# Patient Record
Sex: FEMALE | Race: WHITE | NOT HISPANIC OR LATINO
[De-identification: names, ages, dates, MRNs, and addresses within clinical notes are randomized per-mention and may not be internally consistent; named-entity substitution may affect disease eponyms.]

---

## 2017-03-22 PROBLEM — Z00.00 ENCOUNTER FOR PREVENTIVE HEALTH EXAMINATION: Status: ACTIVE | Noted: 2017-03-22

## 2017-03-31 PROBLEM — I48.92 ATRIAL FLUTTER: Status: ACTIVE | Noted: 2017-03-31

## 2017-03-31 PROBLEM — R42 DIZZINESS: Status: ACTIVE | Noted: 2017-03-31

## 2017-03-31 PROBLEM — R06.09 DOE (DYSPNEA ON EXERTION): Status: ACTIVE | Noted: 2017-03-31

## 2017-03-31 PROBLEM — Z78.9 SOCIAL ALCOHOL USE: Status: ACTIVE | Noted: 2017-03-31

## 2017-03-31 PROBLEM — Z87.891 FORMER SMOKER: Status: ACTIVE | Noted: 2017-03-31

## 2017-03-31 PROBLEM — Z86.79 HISTORY OF HYPERTENSION: Status: RESOLVED | Noted: 2017-03-31 | Resolved: 2017-03-31

## 2017-03-31 PROBLEM — I09.9 RHEUMATIC HEART DISEASE: Status: ACTIVE | Noted: 2017-03-31

## 2017-03-31 PROBLEM — Z86.39 HISTORY OF HYPERLIPIDEMIA: Status: RESOLVED | Noted: 2017-03-31 | Resolved: 2017-03-31

## 2017-03-31 PROBLEM — R00.2 PALPITATIONS: Status: ACTIVE | Noted: 2017-03-31

## 2017-03-31 PROBLEM — I34.0 MITRAL VALVE INSUFFICIENCY: Status: ACTIVE | Noted: 2017-03-31

## 2017-03-31 PROBLEM — Z86.79 HISTORY OF MITRAL VALVE STENOSIS: Status: RESOLVED | Noted: 2017-03-31 | Resolved: 2017-03-31

## 2017-04-05 ENCOUNTER — APPOINTMENT (OUTPATIENT)
Dept: CARDIOTHORACIC SURGERY | Facility: CLINIC | Age: 47
End: 2017-04-05

## 2017-04-05 DIAGNOSIS — Z87.891 PERSONAL HISTORY OF NICOTINE DEPENDENCE: ICD-10-CM

## 2017-04-05 DIAGNOSIS — Z86.39 PERSONAL HISTORY OF OTHER ENDOCRINE, NUTRITIONAL AND METABOLIC DISEASE: ICD-10-CM

## 2017-04-05 DIAGNOSIS — I34.0 NONRHEUMATIC MITRAL (VALVE) INSUFFICIENCY: ICD-10-CM

## 2017-04-05 DIAGNOSIS — I48.92 UNSPECIFIED ATRIAL FLUTTER: ICD-10-CM

## 2017-04-05 DIAGNOSIS — Z86.79 PERSONAL HISTORY OF OTHER DISEASES OF THE CIRCULATORY SYSTEM: ICD-10-CM

## 2017-04-05 DIAGNOSIS — Z78.9 OTHER SPECIFIED HEALTH STATUS: ICD-10-CM

## 2017-04-05 DIAGNOSIS — R06.09 OTHER FORMS OF DYSPNEA: ICD-10-CM

## 2017-04-05 DIAGNOSIS — R00.2 PALPITATIONS: ICD-10-CM

## 2017-04-05 DIAGNOSIS — I09.9 RHEUMATIC HEART DISEASE, UNSPECIFIED: ICD-10-CM

## 2017-04-05 DIAGNOSIS — R42 DIZZINESS AND GIDDINESS: ICD-10-CM

## 2017-04-10 ENCOUNTER — RECORD ABSTRACTING (OUTPATIENT)
Age: 47
End: 2017-04-10

## 2017-04-10 DIAGNOSIS — R01.1 CARDIAC MURMUR, UNSPECIFIED: ICD-10-CM

## 2017-04-12 ENCOUNTER — APPOINTMENT (OUTPATIENT)
Dept: CARDIOTHORACIC SURGERY | Facility: CLINIC | Age: 47
End: 2017-04-12

## 2017-04-26 ENCOUNTER — APPOINTMENT (OUTPATIENT)
Dept: CARDIOTHORACIC SURGERY | Facility: CLINIC | Age: 47
End: 2017-04-26

## 2017-04-26 VITALS — SYSTOLIC BLOOD PRESSURE: 102 MMHG | DIASTOLIC BLOOD PRESSURE: 45 MMHG

## 2017-04-26 VITALS
TEMPERATURE: 98.2 F | HEIGHT: 63 IN | OXYGEN SATURATION: 98 % | BODY MASS INDEX: 26.58 KG/M2 | SYSTOLIC BLOOD PRESSURE: 89 MMHG | RESPIRATION RATE: 15 BRPM | DIASTOLIC BLOOD PRESSURE: 46 MMHG | HEART RATE: 73 BPM | WEIGHT: 150 LBS

## 2017-04-26 DIAGNOSIS — Z78.9 OTHER SPECIFIED HEALTH STATUS: ICD-10-CM

## 2017-04-26 DIAGNOSIS — Z87.891 PERSONAL HISTORY OF NICOTINE DEPENDENCE: ICD-10-CM

## 2017-04-26 RX ORDER — ATORVASTATIN CALCIUM 20 MG/1
20 TABLET, FILM COATED ORAL
Refills: 0 | Status: DISCONTINUED | COMMUNITY
End: 2017-04-26

## 2017-06-13 ENCOUNTER — INPATIENT (INPATIENT)
Facility: HOSPITAL | Age: 47
LOS: 7 days | Discharge: HOME | DRG: 220 | End: 2017-06-21
Payer: COMMERCIAL

## 2017-06-13 VITALS
SYSTOLIC BLOOD PRESSURE: 96 MMHG | TEMPERATURE: 98 F | HEART RATE: 82 BPM | DIASTOLIC BLOOD PRESSURE: 60 MMHG | RESPIRATION RATE: 17 BRPM | WEIGHT: 149.91 LBS | HEIGHT: 63 IN | OXYGEN SATURATION: 98 %

## 2017-06-13 DIAGNOSIS — I48.91 UNSPECIFIED ATRIAL FIBRILLATION: ICD-10-CM

## 2017-06-13 DIAGNOSIS — I34.0 NONRHEUMATIC MITRAL (VALVE) INSUFFICIENCY: ICD-10-CM

## 2017-06-13 DIAGNOSIS — Z98.890 OTHER SPECIFIED POSTPROCEDURAL STATES: Chronic | ICD-10-CM

## 2017-06-13 LAB
ALBUMIN SERPL ELPH-MCNC: 4.1 G/DL — SIGNIFICANT CHANGE UP (ref 3.3–5)
ALP SERPL-CCNC: 65 U/L — SIGNIFICANT CHANGE UP (ref 40–120)
ALT FLD-CCNC: 21 U/L RC — SIGNIFICANT CHANGE UP (ref 10–45)
ANION GAP SERPL CALC-SCNC: 13 MMOL/L — SIGNIFICANT CHANGE UP (ref 5–17)
APPEARANCE UR: CLEAR — SIGNIFICANT CHANGE UP
APTT BLD: 32.4 SEC — SIGNIFICANT CHANGE UP (ref 27.5–37.4)
AST SERPL-CCNC: 32 U/L — SIGNIFICANT CHANGE UP (ref 10–40)
BILIRUB SERPL-MCNC: 0.6 MG/DL — SIGNIFICANT CHANGE UP (ref 0.2–1.2)
BILIRUB UR-MCNC: NEGATIVE — SIGNIFICANT CHANGE UP
BLD GP AB SCN SERPL QL: NEGATIVE — SIGNIFICANT CHANGE UP
BUN SERPL-MCNC: 32 MG/DL — HIGH (ref 7–23)
CALCIUM SERPL-MCNC: 9.5 MG/DL — SIGNIFICANT CHANGE UP (ref 8.4–10.5)
CHLORIDE SERPL-SCNC: 99 MMOL/L — SIGNIFICANT CHANGE UP (ref 96–108)
CO2 SERPL-SCNC: 28 MMOL/L — SIGNIFICANT CHANGE UP (ref 22–31)
COLOR SPEC: YELLOW — SIGNIFICANT CHANGE UP
CREAT SERPL-MCNC: 0.92 MG/DL — SIGNIFICANT CHANGE UP (ref 0.5–1.3)
DIFF PNL FLD: ABNORMAL
GLUCOSE SERPL-MCNC: 92 MG/DL — SIGNIFICANT CHANGE UP (ref 70–99)
GLUCOSE UR QL: NEGATIVE — SIGNIFICANT CHANGE UP
HCG SERPL-ACNC: <2 MIU/ML — SIGNIFICANT CHANGE UP
HCT VFR BLD CALC: 42.5 % — SIGNIFICANT CHANGE UP (ref 34.5–45)
HGB BLD-MCNC: 14 G/DL — SIGNIFICANT CHANGE UP (ref 11.5–15.5)
INR BLD: 1.48 RATIO — HIGH (ref 0.88–1.16)
KETONES UR-MCNC: NEGATIVE — SIGNIFICANT CHANGE UP
LEUKOCYTE ESTERASE UR-ACNC: NEGATIVE — SIGNIFICANT CHANGE UP
MCHC RBC-ENTMCNC: 31.3 PG — SIGNIFICANT CHANGE UP (ref 27–34)
MCHC RBC-ENTMCNC: 33 GM/DL — SIGNIFICANT CHANGE UP (ref 32–36)
MCV RBC AUTO: 95.1 FL — SIGNIFICANT CHANGE UP (ref 80–100)
NITRITE UR-MCNC: NEGATIVE — SIGNIFICANT CHANGE UP
NT-PROBNP SERPL-SCNC: 2160 PG/ML — HIGH (ref 0–300)
PA ADP PRP-ACNC: 237 PRU — SIGNIFICANT CHANGE UP (ref 194–417)
PH UR: 7 — SIGNIFICANT CHANGE UP (ref 5–8)
PLATELET # BLD AUTO: 241 K/UL — SIGNIFICANT CHANGE UP (ref 150–400)
POTASSIUM SERPL-MCNC: 3.6 MMOL/L — SIGNIFICANT CHANGE UP (ref 3.5–5.3)
POTASSIUM SERPL-SCNC: 3.6 MMOL/L — SIGNIFICANT CHANGE UP (ref 3.5–5.3)
PROT SERPL-MCNC: 7.3 G/DL — SIGNIFICANT CHANGE UP (ref 6–8.3)
PROT UR-MCNC: SIGNIFICANT CHANGE UP
PROTHROM AB SERPL-ACNC: 16.1 SEC — HIGH (ref 9.8–12.7)
RBC # BLD: 4.47 M/UL — SIGNIFICANT CHANGE UP (ref 3.8–5.2)
RBC # FLD: 11.3 % — SIGNIFICANT CHANGE UP (ref 10.3–14.5)
RH IG SCN BLD-IMP: POSITIVE — SIGNIFICANT CHANGE UP
SODIUM SERPL-SCNC: 140 MMOL/L — SIGNIFICANT CHANGE UP (ref 135–145)
SP GR SPEC: 1.02 — SIGNIFICANT CHANGE UP (ref 1.01–1.02)
UROBILINOGEN FLD QL: NEGATIVE — SIGNIFICANT CHANGE UP
WBC # BLD: 7 K/UL — SIGNIFICANT CHANGE UP (ref 3.8–10.5)
WBC # FLD AUTO: 7 K/UL — SIGNIFICANT CHANGE UP (ref 3.8–10.5)

## 2017-06-13 PROCEDURE — 99221 1ST HOSP IP/OBS SF/LOW 40: CPT

## 2017-06-13 RX ORDER — METOPROLOL TARTRATE 50 MG
50 TABLET ORAL EVERY 8 HOURS
Qty: 0 | Refills: 0 | Status: DISCONTINUED | OUTPATIENT
Start: 2017-06-13 | End: 2017-06-14

## 2017-06-13 RX ORDER — HEPARIN SODIUM 5000 [USP'U]/ML
INJECTION INTRAVENOUS; SUBCUTANEOUS
Qty: 25000 | Refills: 0 | Status: DISCONTINUED | OUTPATIENT
Start: 2017-06-13 | End: 2017-06-15

## 2017-06-13 RX ORDER — SODIUM CHLORIDE 9 MG/ML
3 INJECTION INTRAMUSCULAR; INTRAVENOUS; SUBCUTANEOUS EVERY 8 HOURS
Qty: 0 | Refills: 0 | Status: DISCONTINUED | OUTPATIENT
Start: 2017-06-13 | End: 2017-06-15

## 2017-06-13 RX ORDER — DIGOXIN 250 MCG
0.12 TABLET ORAL DAILY
Qty: 0 | Refills: 0 | Status: DISCONTINUED | OUTPATIENT
Start: 2017-06-13 | End: 2017-06-15

## 2017-06-13 RX ORDER — POTASSIUM CHLORIDE 20 MEQ
40 PACKET (EA) ORAL ONCE
Qty: 0 | Refills: 0 | Status: COMPLETED | OUTPATIENT
Start: 2017-06-13 | End: 2017-06-13

## 2017-06-13 RX ADMIN — HEPARIN SODIUM 1200 UNIT(S)/HR: 5000 INJECTION INTRAVENOUS; SUBCUTANEOUS at 19:20

## 2017-06-13 RX ADMIN — Medication 50 MILLIGRAM(S): at 22:04

## 2017-06-13 RX ADMIN — Medication 40 MILLIEQUIVALENT(S): at 20:39

## 2017-06-13 RX ADMIN — SODIUM CHLORIDE 3 MILLILITER(S): 9 INJECTION INTRAMUSCULAR; INTRAVENOUS; SUBCUTANEOUS at 22:04

## 2017-06-13 NOTE — H&P ADULT - PROBLEM SELECTOR PLAN 1
Min invasive MVR Thurs with Dr. Silva.  Continue home dose digoxin and metolazone.  Continue pre-op cardiac surgery workup.

## 2017-06-13 NOTE — H&P ADULT - NSHPPHYSICALEXAM_GEN_ALL_CORE
General: NAD  HEENT:  NC/AT  Respiratory: clear to auscultation bilaterally, no wheeze, no rhonchi, no crackles noted  Cardiovascular: + irregular rate and rhythm, S1 and S2 audible, + STEPHON murmur, gallop or rub noted  GI: bowel sounds present x4, abdomen soft, non tender, non distended  Peripheral Vascular:  1+ edema, 2+ peripheral pulses, no clubbing, cyanosis , +varicosities/PVD noted  Musculoskeletal: 5/5 strength bilateral upper and lower extremities  Psychiatric: Normal mood, normal affect observed  Neuro: alert and oriented x 4, gait steady, speech clear, no focal deficits noted  Skin: Right groin procedure site CDI.  no bleeding, no hematoma, site soft, non tender, positive pedal pulses bilaterally General: NAD  HEENT:  NC/AT  Neuro: A&Ox 4, gait steady, speech clear, no focal deficits noted  Respiratory: B/L BS CTA, no wheeze, no rhonchi, no crackles noted  Cardiovascular: + irregular rate and rhythm, S1 and S2 audible, + Systolic murmur 4/6  GI:  Abd soft, NT/ND, +BSx4Q  Peripheral Vascular:  B/L LE (-) edema, 2+ peripheral pulses, no clubbing, cyanosis   Musculoskeletal: 5/5 strength bilateral upper and lower extremities  Psychiatric: Normal mood, normal affect observed  Skin: Right groin procedure site CDI.  non-tender, no bleeding, no hematoma General: NAD  HEENT:  NC/AT  Neuro: A&Ox 4, gait steady, speech clear, no focal deficits noted  Respiratory: B/L BS CTA, no wheeze, no rhonchi, no crackles noted  Cardiovascular: + irregular rate and rhythm, S1 and S2 audible, + Systolic murmur 4/6  GI:  Abd soft, NT/ND, +BSx4Q  Peripheral Vascular:  B/L LE (-) edema, 2+ peripheral pulses, no clubbing, cyanosis   Musculoskeletal: 5/5 strength bilateral upper and lower extremities  Psychiatric: Normal mood, normal affect observed  Skin: Normal exam to inspection and palpation. Right groin cath incision CDI YAS, no hematoma.

## 2017-06-13 NOTE — H&P ADULT - NSHPSOCIALHISTORY_GEN_ALL_CORE
SOCIAL HISTORY:  Smoker: [ X] Yes  [ ] No        PACK YEARS:                         WHEN QUIT? stopped in 2016  ETOH use: [ ] Yes  [X] No              FREQUENCY / QUANTITY:  Ilicit Drug use:  [ ] Yes  [ X ] No SOCIAL HISTORY:  Smoker: [ X] Yes  [ ] No        PPD: 0.5 x 15 years                       WHEN QUIT? stopped in 2016  ETOH use: [ ] Yes  [X] No              FREQUENCY / QUANTITY:  Ilicit Drug use:  [ ] Yes  [ X ] No

## 2017-06-13 NOTE — H&P ADULT - HISTORY OF PRESENT ILLNESS
This is a 48 y/o female with PMHx of Afib on Coumadin, MR s/p cath at [facility] with normal coronaries, admitted to [facility] for pre-op MVR evaluation. Last dose of coumadin on Sat 6/10 per pt. Pt currently denies any CP, SOB, N/V/D, fever, chills or palpitations. This is a 48 y/o female with PMHx of Afib on Coumadin, MR s/p cath at [facility] in May 2017 with normal coronaries, admitted to [facility] for pre-op MVR evaluation. Last dose of coumadin on Sat 6/10 per pt. Pt currently denies any CP, SOB, N/V/D, fever, chills or palpitations.

## 2017-06-14 LAB
ANION GAP SERPL CALC-SCNC: 14 MMOL/L — SIGNIFICANT CHANGE UP (ref 5–17)
APTT BLD: 110.7 SEC — HIGH (ref 27.5–37.4)
APTT BLD: 144.9 SEC — CRITICAL HIGH (ref 27.5–37.4)
APTT BLD: 72.9 SEC — HIGH (ref 27.5–37.4)
BLD GP AB SCN SERPL QL: NEGATIVE — SIGNIFICANT CHANGE UP
BUN SERPL-MCNC: 35 MG/DL — HIGH (ref 7–23)
CALCIUM SERPL-MCNC: 9.3 MG/DL — SIGNIFICANT CHANGE UP (ref 8.4–10.5)
CHLORIDE SERPL-SCNC: 99 MMOL/L — SIGNIFICANT CHANGE UP (ref 96–108)
CO2 SERPL-SCNC: 25 MMOL/L — SIGNIFICANT CHANGE UP (ref 22–31)
CREAT SERPL-MCNC: 0.98 MG/DL — SIGNIFICANT CHANGE UP (ref 0.5–1.3)
GLUCOSE SERPL-MCNC: 99 MG/DL — SIGNIFICANT CHANGE UP (ref 70–99)
HBA1C BLD-MCNC: 5.5 % — SIGNIFICANT CHANGE UP (ref 4–5.6)
HCT VFR BLD CALC: 41.2 % — SIGNIFICANT CHANGE UP (ref 34.5–45)
HGB BLD-MCNC: 14 G/DL — SIGNIFICANT CHANGE UP (ref 11.5–15.5)
INR BLD: 1.52 RATIO — HIGH (ref 0.88–1.16)
MCHC RBC-ENTMCNC: 32.5 PG — SIGNIFICANT CHANGE UP (ref 27–34)
MCHC RBC-ENTMCNC: 34 GM/DL — SIGNIFICANT CHANGE UP (ref 32–36)
MCV RBC AUTO: 95.5 FL — SIGNIFICANT CHANGE UP (ref 80–100)
MRSA PCR RESULT.: SIGNIFICANT CHANGE UP
PLATELET # BLD AUTO: 225 K/UL — SIGNIFICANT CHANGE UP (ref 150–400)
POTASSIUM SERPL-MCNC: 4 MMOL/L — SIGNIFICANT CHANGE UP (ref 3.5–5.3)
POTASSIUM SERPL-SCNC: 4 MMOL/L — SIGNIFICANT CHANGE UP (ref 3.5–5.3)
PROTHROM AB SERPL-ACNC: 16.6 SEC — HIGH (ref 9.8–12.7)
RBC # BLD: 4.31 M/UL — SIGNIFICANT CHANGE UP (ref 3.8–5.2)
RBC # FLD: 11.3 % — SIGNIFICANT CHANGE UP (ref 10.3–14.5)
RH IG SCN BLD-IMP: POSITIVE — SIGNIFICANT CHANGE UP
S AUREUS DNA NOSE QL NAA+PROBE: SIGNIFICANT CHANGE UP
SODIUM SERPL-SCNC: 138 MMOL/L — SIGNIFICANT CHANGE UP (ref 135–145)
T3 SERPL-MCNC: 98 NG/DL — SIGNIFICANT CHANGE UP (ref 80–200)
T4 AB SER-ACNC: 6.5 UG/DL — SIGNIFICANT CHANGE UP (ref 4.6–12)
TSH SERPL-MCNC: 2.11 UIU/ML — SIGNIFICANT CHANGE UP (ref 0.27–4.2)
WBC # BLD: 8.2 K/UL — SIGNIFICANT CHANGE UP (ref 3.8–10.5)
WBC # FLD AUTO: 8.2 K/UL — SIGNIFICANT CHANGE UP (ref 3.8–10.5)

## 2017-06-14 PROCEDURE — 93010 ELECTROCARDIOGRAM REPORT: CPT

## 2017-06-14 PROCEDURE — 93880 EXTRACRANIAL BILAT STUDY: CPT | Mod: 26

## 2017-06-14 PROCEDURE — 71020: CPT | Mod: 26

## 2017-06-14 RX ORDER — CHLORHEXIDINE GLUCONATE 213 G/1000ML
1 SOLUTION TOPICAL
Qty: 0 | Refills: 0 | Status: DISCONTINUED | OUTPATIENT
Start: 2017-06-14 | End: 2017-06-15

## 2017-06-14 RX ORDER — METOPROLOL TARTRATE 50 MG
2.5 TABLET ORAL ONCE
Qty: 0 | Refills: 0 | Status: DISCONTINUED | OUTPATIENT
Start: 2017-06-14 | End: 2017-06-15

## 2017-06-14 RX ORDER — METOPROLOL TARTRATE 50 MG
25 TABLET ORAL ONCE
Qty: 0 | Refills: 0 | Status: COMPLETED | OUTPATIENT
Start: 2017-06-14 | End: 2017-06-14

## 2017-06-14 RX ORDER — METOPROLOL TARTRATE 50 MG
50 TABLET ORAL EVERY 8 HOURS
Qty: 0 | Refills: 0 | Status: DISCONTINUED | OUTPATIENT
Start: 2017-06-14 | End: 2017-06-14

## 2017-06-14 RX ORDER — CEFUROXIME AXETIL 250 MG
1500 TABLET ORAL ONCE
Qty: 0 | Refills: 0 | Status: DISCONTINUED | OUTPATIENT
Start: 2017-06-14 | End: 2017-06-15

## 2017-06-14 RX ORDER — METOPROLOL TARTRATE 50 MG
75 TABLET ORAL EVERY 8 HOURS
Qty: 0 | Refills: 0 | Status: DISCONTINUED | OUTPATIENT
Start: 2017-06-14 | End: 2017-06-15

## 2017-06-14 RX ADMIN — HEPARIN SODIUM 1000 UNIT(S)/HR: 5000 INJECTION INTRAVENOUS; SUBCUTANEOUS at 03:44

## 2017-06-14 RX ADMIN — Medication 50 MILLIGRAM(S): at 22:14

## 2017-06-14 RX ADMIN — HEPARIN SODIUM 0 UNIT(S)/HR: 5000 INJECTION INTRAVENOUS; SUBCUTANEOUS at 02:43

## 2017-06-14 RX ADMIN — SODIUM CHLORIDE 3 MILLILITER(S): 9 INJECTION INTRAMUSCULAR; INTRAVENOUS; SUBCUTANEOUS at 06:47

## 2017-06-14 RX ADMIN — SODIUM CHLORIDE 3 MILLILITER(S): 9 INJECTION INTRAMUSCULAR; INTRAVENOUS; SUBCUTANEOUS at 13:01

## 2017-06-14 RX ADMIN — SODIUM CHLORIDE 3 MILLILITER(S): 9 INJECTION INTRAMUSCULAR; INTRAVENOUS; SUBCUTANEOUS at 21:49

## 2017-06-14 RX ADMIN — CHLORHEXIDINE GLUCONATE 1 APPLICATION(S): 213 SOLUTION TOPICAL at 21:42

## 2017-06-14 RX ADMIN — HEPARIN SODIUM 900 UNIT(S)/HR: 5000 INJECTION INTRAVENOUS; SUBCUTANEOUS at 20:17

## 2017-06-14 RX ADMIN — HEPARIN SODIUM 900 UNIT(S)/HR: 5000 INJECTION INTRAVENOUS; SUBCUTANEOUS at 13:02

## 2017-06-14 RX ADMIN — Medication 25 MILLIGRAM(S): at 08:12

## 2017-06-14 NOTE — PROGRESS NOTE ADULT - SUBJECTIVE AND OBJECTIVE BOX
Cardiac Surgery Pre-op Note:    CC: Patient is a 47y old  Female who presents with a chief complaint of "I'm here for cardiac surgery" (2017 18:16)      Referring Physician:                                                                                                           Surgeon:    Procedure: (Date) (Procedure)    Allergies    No Known Allergies    Intolerances        HPI:  This is a 46 y/o female with PMHx of Afib on Coumadin, MR s/p cath at [facility] in May 2017 with normal coronaries, admitted to [facility] for pre-op MVR evaluation. Last dose of coumadin on Sat 6/10 per pt. Pt currently denies any CP, SOB, N/V/D, fever, chills or palpitations. (2017 18:16)      PAST MEDICAL & SURGICAL HISTORY:  Mitral regurgitation  Atrial fibrillation  Status post balloon mitral valvuloplasty:       MEDICATIONS  (STANDING):  sodium chloride 0.9% lock flush 3milliLiter(s) IV Push every 8 hours  digoxin     Tablet 0.125milliGRAM(s) Oral daily  metolazone 5milliGRAM(s) Oral <User Schedule>  heparin  Infusion. Unit(s)/Hr IV Continuous <Continuous>  metoprolol 50milliGRAM(s) Oral every 8 hours  cefuroxime  IVPB 1500milliGRAM(s) IV Intermittent once  chlorhexidine 4% Liquid 1Application(s) Topical two times a day    MEDICATIONS  (PRN):        Labs:                        14.0   8.2   )-----------( 225      ( 2017 01:29 )             41.2     06-    138  |  99  |  35<H>  ----------------------------<  99  4.0   |  25  |  0.98    Ca    9.3      2017 01:29    TPro  7.3  /  Alb  4.1  /  TBili  0.6  /  DBili  x   /  AST  32  /  ALT  21  /  AlkPhos  65  06-13    PT/INR - ( 2017 01:29 )   PT: 16.6 sec;   INR: 1.52 ratio         PTT - ( 2017 12:14 )  PTT:110.7 sec    Blood Type: ABO Interpretation: O ( @ 01:56)    HGB A1C: Hemoglobin A1C, Whole Blood: 5.5 % ( @ 23:24)    Prealbumin:   Pro-BNP: Serum Pro-Brain Natriuretic Peptide: 2160 pg/mL ( @ 18:41)    Thyroid Panel:   MRSA: MRSA PCR Result.: NotDetec ( @ 00:54)   / MSSA: MSSA PCR Result.: NotDetec ( @ 00:54)    Urinalysis Basic - ( 2017 18:45 )    Color: Yellow / Appearance: Clear / S.020 / pH: x  Gluc: x / Ketone: Negative  / Bili: Negative / Urobili: Negative   Blood: x / Protein: Trace / Nitrite: Negative   Leuk Esterase: Negative / RBC: 10-25 /HPF / WBC x   Sq Epi: x / Non Sq Epi: Occasional /HPF / Bacteria: x        CXR: clear    EKG:    Carotid Duplex:      PFT's:pending    Echocardiogram:    Cardiac catheterization:    Vein Mapping:    Gen: WN/WD NAD  Neuro: AAOx3, nonfocal  Pulm: CTA B/L  CV: RRR, S1S2  Abd: Soft, NT, ND +BS  Ext: No edema, + peripheral pulses      Pt has AICD/PPM [ ] Yes  [x ] No             Brand Name:  Pre-op Beta Blocker ordered within 24 hrs of surgery (CABG ONLY)?  [ ] Yes  [ ] No  If not, Why?  Type & Cross  [x ] Yes  [ ] No  NPO after Midnight [x ] Yes  [ ] No  Pre-op ABX ordered, to be taped on chart:  [x ] Yes  [ ] No     Hibiclens/Peridex ordered [x ] Yes  [ ] No  Intraop on Hold: PRBCs, CXR, REJI [ ]   Consent obtained  [x ] Yes  [ ] No

## 2017-06-15 ENCOUNTER — APPOINTMENT (OUTPATIENT)
Dept: CARDIOTHORACIC SURGERY | Facility: HOSPITAL | Age: 47
End: 2017-06-15

## 2017-06-15 ENCOUNTER — TRANSCRIPTION ENCOUNTER (OUTPATIENT)
Age: 47
End: 2017-06-15

## 2017-06-15 ENCOUNTER — RESULT REVIEW (OUTPATIENT)
Age: 47
End: 2017-06-15

## 2017-06-15 LAB
ALBUMIN SERPL ELPH-MCNC: 4 G/DL — SIGNIFICANT CHANGE UP (ref 3.3–5)
ALP SERPL-CCNC: 39 U/L — LOW (ref 40–120)
ALT FLD-CCNC: 17 U/L RC — SIGNIFICANT CHANGE UP (ref 10–45)
ANION GAP SERPL CALC-SCNC: 17 MMOL/L — SIGNIFICANT CHANGE UP (ref 5–17)
ANION GAP SERPL CALC-SCNC: 17 MMOL/L — SIGNIFICANT CHANGE UP (ref 5–17)
APTT BLD: 30.8 SEC — SIGNIFICANT CHANGE UP (ref 27.5–37.4)
APTT BLD: 71 SEC — HIGH (ref 27.5–37.4)
AST SERPL-CCNC: 58 U/L — HIGH (ref 10–40)
BASOPHILS # BLD AUTO: 0 K/UL — SIGNIFICANT CHANGE UP (ref 0–0.2)
BASOPHILS NFR BLD AUTO: 0.2 % — SIGNIFICANT CHANGE UP (ref 0–2)
BILIRUB SERPL-MCNC: 1.4 MG/DL — HIGH (ref 0.2–1.2)
BUN SERPL-MCNC: 21 MG/DL — SIGNIFICANT CHANGE UP (ref 7–23)
BUN SERPL-MCNC: 24 MG/DL — HIGH (ref 7–23)
CALCIUM SERPL-MCNC: 11.2 MG/DL — HIGH (ref 8.4–10.5)
CALCIUM SERPL-MCNC: 9 MG/DL — SIGNIFICANT CHANGE UP (ref 8.4–10.5)
CHLORIDE SERPL-SCNC: 100 MMOL/L — SIGNIFICANT CHANGE UP (ref 96–108)
CHLORIDE SERPL-SCNC: 96 MMOL/L — SIGNIFICANT CHANGE UP (ref 96–108)
CK MB BLD-MCNC: 2 % — SIGNIFICANT CHANGE UP (ref 0–3.5)
CK MB CFR SERPL CALC: 23.4 NG/ML — HIGH (ref 0–3.8)
CK SERPL-CCNC: 1193 U/L — HIGH (ref 25–170)
CO2 SERPL-SCNC: 23 MMOL/L — SIGNIFICANT CHANGE UP (ref 22–31)
CO2 SERPL-SCNC: 24 MMOL/L — SIGNIFICANT CHANGE UP (ref 22–31)
CREAT SERPL-MCNC: 0.78 MG/DL — SIGNIFICANT CHANGE UP (ref 0.5–1.3)
CREAT SERPL-MCNC: 0.97 MG/DL — SIGNIFICANT CHANGE UP (ref 0.5–1.3)
EOSINOPHIL # BLD AUTO: 0.1 K/UL — SIGNIFICANT CHANGE UP (ref 0–0.5)
EOSINOPHIL NFR BLD AUTO: 0.8 % — SIGNIFICANT CHANGE UP (ref 0–6)
FIBRINOGEN PPP-MCNC: 266 MG/DL — LOW (ref 310–510)
GAS PNL BLDA: SIGNIFICANT CHANGE UP
GLUCOSE SERPL-MCNC: 119 MG/DL — HIGH (ref 70–99)
GLUCOSE SERPL-MCNC: 96 MG/DL — SIGNIFICANT CHANGE UP (ref 70–99)
HCT VFR BLD CALC: 36.9 % — SIGNIFICANT CHANGE UP (ref 34.5–45)
HCT VFR BLD CALC: 42.7 % — SIGNIFICANT CHANGE UP (ref 34.5–45)
HGB BLD-MCNC: 13.2 G/DL — SIGNIFICANT CHANGE UP (ref 11.5–15.5)
HGB BLD-MCNC: 15 G/DL — SIGNIFICANT CHANGE UP (ref 11.5–15.5)
INR BLD: 1.42 RATIO — HIGH (ref 0.88–1.16)
LYMPHOCYTES # BLD AUTO: 1.6 K/UL — SIGNIFICANT CHANGE UP (ref 1–3.3)
LYMPHOCYTES # BLD AUTO: 10.6 % — LOW (ref 13–44)
MAGNESIUM SERPL-MCNC: 1.5 MG/DL — LOW (ref 1.6–2.6)
MCHC RBC-ENTMCNC: 33.4 PG — SIGNIFICANT CHANGE UP (ref 27–34)
MCHC RBC-ENTMCNC: 33.9 PG — SIGNIFICANT CHANGE UP (ref 27–34)
MCHC RBC-ENTMCNC: 35.1 GM/DL — SIGNIFICANT CHANGE UP (ref 32–36)
MCHC RBC-ENTMCNC: 35.7 GM/DL — SIGNIFICANT CHANGE UP (ref 32–36)
MCV RBC AUTO: 95 FL — SIGNIFICANT CHANGE UP (ref 80–100)
MCV RBC AUTO: 95.3 FL — SIGNIFICANT CHANGE UP (ref 80–100)
MONOCYTES # BLD AUTO: 0.6 K/UL — SIGNIFICANT CHANGE UP (ref 0–0.9)
MONOCYTES NFR BLD AUTO: 4.1 % — SIGNIFICANT CHANGE UP (ref 2–14)
NEUTROPHILS # BLD AUTO: 12.4 K/UL — HIGH (ref 1.8–7.4)
NEUTROPHILS NFR BLD AUTO: 84.3 % — HIGH (ref 43–77)
PLATELET # BLD AUTO: 150 K/UL — SIGNIFICANT CHANGE UP (ref 150–400)
PLATELET # BLD AUTO: 234 K/UL — SIGNIFICANT CHANGE UP (ref 150–400)
POTASSIUM SERPL-MCNC: 3.3 MMOL/L — LOW (ref 3.5–5.3)
POTASSIUM SERPL-MCNC: 4.1 MMOL/L — SIGNIFICANT CHANGE UP (ref 3.5–5.3)
POTASSIUM SERPL-SCNC: 3.3 MMOL/L — LOW (ref 3.5–5.3)
POTASSIUM SERPL-SCNC: 4.1 MMOL/L — SIGNIFICANT CHANGE UP (ref 3.5–5.3)
PROT SERPL-MCNC: 5.9 G/DL — LOW (ref 6–8.3)
PROTHROM AB SERPL-ACNC: 15.5 SEC — HIGH (ref 9.8–12.7)
RBC # BLD: 3.89 M/UL — SIGNIFICANT CHANGE UP (ref 3.8–5.2)
RBC # BLD: 4.48 M/UL — SIGNIFICANT CHANGE UP (ref 3.8–5.2)
RBC # FLD: 11.4 % — SIGNIFICANT CHANGE UP (ref 10.3–14.5)
RBC # FLD: 11.4 % — SIGNIFICANT CHANGE UP (ref 10.3–14.5)
SODIUM SERPL-SCNC: 137 MMOL/L — SIGNIFICANT CHANGE UP (ref 135–145)
SODIUM SERPL-SCNC: 140 MMOL/L — SIGNIFICANT CHANGE UP (ref 135–145)
TROPONIN T SERPL-MCNC: 0.41 NG/ML — HIGH (ref 0–0.06)
WBC # BLD: 14.8 K/UL — HIGH (ref 3.8–10.5)
WBC # BLD: 7.6 K/UL — SIGNIFICANT CHANGE UP (ref 3.8–10.5)
WBC # FLD AUTO: 14.8 K/UL — HIGH (ref 3.8–10.5)
WBC # FLD AUTO: 7.6 K/UL — SIGNIFICANT CHANGE UP (ref 3.8–10.5)

## 2017-06-15 PROCEDURE — 71010: CPT | Mod: 26

## 2017-06-15 PROCEDURE — 33430 REPLACEMENT OF MITRAL VALVE: CPT

## 2017-06-15 PROCEDURE — 93010 ELECTROCARDIOGRAM REPORT: CPT

## 2017-06-15 PROCEDURE — 88305 TISSUE EXAM BY PATHOLOGIST: CPT | Mod: 26

## 2017-06-15 RX ORDER — POTASSIUM CHLORIDE 20 MEQ
10 PACKET (EA) ORAL
Qty: 0 | Refills: 0 | Status: DISCONTINUED | OUTPATIENT
Start: 2017-06-15 | End: 2017-06-16

## 2017-06-15 RX ORDER — NOREPINEPHRINE BITARTRATE/D5W 8 MG/250ML
0.04 PLASTIC BAG, INJECTION (ML) INTRAVENOUS
Qty: 8 | Refills: 0 | Status: DISCONTINUED | OUTPATIENT
Start: 2017-06-15 | End: 2017-06-16

## 2017-06-15 RX ORDER — ASPIRIN/CALCIUM CARB/MAGNESIUM 324 MG
325 TABLET ORAL DAILY
Qty: 0 | Refills: 0 | Status: DISCONTINUED | OUTPATIENT
Start: 2017-06-15 | End: 2017-06-15

## 2017-06-15 RX ORDER — INSULIN HUMAN 100 [IU]/ML
2 INJECTION, SOLUTION SUBCUTANEOUS
Qty: 100 | Refills: 0 | Status: DISCONTINUED | OUTPATIENT
Start: 2017-06-15 | End: 2017-06-16

## 2017-06-15 RX ORDER — DEXMEDETOMIDINE HYDROCHLORIDE IN 0.9% SODIUM CHLORIDE 4 UG/ML
0.2 INJECTION INTRAVENOUS
Qty: 200 | Refills: 0 | Status: DISCONTINUED | OUTPATIENT
Start: 2017-06-15 | End: 2017-06-15

## 2017-06-15 RX ORDER — FENTANYL CITRATE 50 UG/ML
25 INJECTION INTRAVENOUS ONCE
Qty: 0 | Refills: 0 | Status: DISCONTINUED | OUTPATIENT
Start: 2017-06-15 | End: 2017-06-15

## 2017-06-15 RX ORDER — WARFARIN SODIUM 2.5 MG/1
5 TABLET ORAL ONCE
Qty: 0 | Refills: 0 | Status: COMPLETED | OUTPATIENT
Start: 2017-06-15 | End: 2017-06-15

## 2017-06-15 RX ORDER — SODIUM CHLORIDE 9 MG/ML
1000 INJECTION, SOLUTION INTRAVENOUS
Qty: 0 | Refills: 0 | Status: DISCONTINUED | OUTPATIENT
Start: 2017-06-15 | End: 2017-06-16

## 2017-06-15 RX ORDER — METOCLOPRAMIDE HCL 10 MG
10 TABLET ORAL EVERY 8 HOURS
Qty: 0 | Refills: 0 | Status: COMPLETED | OUTPATIENT
Start: 2017-06-15 | End: 2017-06-16

## 2017-06-15 RX ORDER — ALBUMIN HUMAN 25 %
250 VIAL (ML) INTRAVENOUS ONCE
Qty: 0 | Refills: 0 | Status: COMPLETED | OUTPATIENT
Start: 2017-06-15 | End: 2017-06-15

## 2017-06-15 RX ORDER — ASPIRIN/CALCIUM CARB/MAGNESIUM 324 MG
81 TABLET ORAL DAILY
Qty: 0 | Refills: 0 | Status: DISCONTINUED | OUTPATIENT
Start: 2017-06-16 | End: 2017-06-21

## 2017-06-15 RX ORDER — HYDROMORPHONE HYDROCHLORIDE 2 MG/ML
0.5 INJECTION INTRAMUSCULAR; INTRAVENOUS; SUBCUTANEOUS ONCE
Qty: 0 | Refills: 0 | Status: DISCONTINUED | OUTPATIENT
Start: 2017-06-15 | End: 2017-06-15

## 2017-06-15 RX ORDER — CEFUROXIME AXETIL 250 MG
1500 TABLET ORAL EVERY 8 HOURS
Qty: 0 | Refills: 0 | Status: COMPLETED | OUTPATIENT
Start: 2017-06-15 | End: 2017-06-17

## 2017-06-15 RX ORDER — POTASSIUM CHLORIDE 20 MEQ
10 PACKET (EA) ORAL ONCE
Qty: 0 | Refills: 0 | Status: DISCONTINUED | OUTPATIENT
Start: 2017-06-15 | End: 2017-06-16

## 2017-06-15 RX ORDER — POTASSIUM CHLORIDE 20 MEQ
10 PACKET (EA) ORAL
Qty: 0 | Refills: 0 | Status: COMPLETED | OUTPATIENT
Start: 2017-06-15 | End: 2017-06-15

## 2017-06-15 RX ORDER — POTASSIUM CHLORIDE 20 MEQ
10 PACKET (EA) ORAL
Qty: 0 | Refills: 0 | Status: COMPLETED | OUTPATIENT
Start: 2017-06-15

## 2017-06-15 RX ORDER — PANTOPRAZOLE SODIUM 20 MG/1
40 TABLET, DELAYED RELEASE ORAL DAILY
Qty: 0 | Refills: 0 | Status: DISCONTINUED | OUTPATIENT
Start: 2017-06-15 | End: 2017-06-16

## 2017-06-15 RX ORDER — MEPERIDINE HYDROCHLORIDE 50 MG/ML
25 INJECTION INTRAMUSCULAR; INTRAVENOUS; SUBCUTANEOUS ONCE
Qty: 0 | Refills: 0 | Status: DISCONTINUED | OUTPATIENT
Start: 2017-06-15 | End: 2017-06-15

## 2017-06-15 RX ADMIN — Medication 125 MILLILITER(S): at 16:54

## 2017-06-15 RX ADMIN — Medication 100 MILLIEQUIVALENT(S): at 12:50

## 2017-06-15 RX ADMIN — FENTANYL CITRATE 25 MICROGRAM(S): 50 INJECTION INTRAVENOUS at 16:25

## 2017-06-15 RX ADMIN — Medication 125 MILLILITER(S): at 13:15

## 2017-06-15 RX ADMIN — INSULIN HUMAN 2 UNIT(S)/HR: 100 INJECTION, SOLUTION SUBCUTANEOUS at 16:00

## 2017-06-15 RX ADMIN — FENTANYL CITRATE 25 MICROGRAM(S): 50 INJECTION INTRAVENOUS at 16:10

## 2017-06-15 RX ADMIN — SODIUM CHLORIDE 3 MILLILITER(S): 9 INJECTION INTRAMUSCULAR; INTRAVENOUS; SUBCUTANEOUS at 05:04

## 2017-06-15 RX ADMIN — WARFARIN SODIUM 5 MILLIGRAM(S): 2.5 TABLET ORAL at 22:04

## 2017-06-15 RX ADMIN — Medication 500 MILLILITER(S): at 21:57

## 2017-06-15 RX ADMIN — Medication 125 MILLILITER(S): at 20:20

## 2017-06-15 RX ADMIN — Medication 10 MILLIGRAM(S): at 15:37

## 2017-06-15 RX ADMIN — HYDROMORPHONE HYDROCHLORIDE 0.5 MILLIGRAM(S): 2 INJECTION INTRAMUSCULAR; INTRAVENOUS; SUBCUTANEOUS at 20:30

## 2017-06-15 RX ADMIN — HYDROMORPHONE HYDROCHLORIDE 0.5 MILLIGRAM(S): 2 INJECTION INTRAMUSCULAR; INTRAVENOUS; SUBCUTANEOUS at 20:45

## 2017-06-15 RX ADMIN — PANTOPRAZOLE SODIUM 40 MILLIGRAM(S): 20 TABLET, DELAYED RELEASE ORAL at 16:54

## 2017-06-15 RX ADMIN — Medication 10 MILLIGRAM(S): at 22:04

## 2017-06-15 RX ADMIN — HEPARIN SODIUM 900 UNIT(S)/HR: 5000 INJECTION INTRAVENOUS; SUBCUTANEOUS at 02:49

## 2017-06-15 RX ADMIN — Medication 125 MILLILITER(S): at 13:40

## 2017-06-15 RX ADMIN — DEXMEDETOMIDINE HYDROCHLORIDE IN 0.9% SODIUM CHLORIDE 3.4 MICROGRAM(S)/KG/HR: 4 INJECTION INTRAVENOUS at 15:07

## 2017-06-15 RX ADMIN — Medication 100 MILLIEQUIVALENT(S): at 18:02

## 2017-06-15 RX ADMIN — Medication 100 MILLIGRAM(S): at 16:12

## 2017-06-15 RX ADMIN — Medication 100 MILLIEQUIVALENT(S): at 14:15

## 2017-06-15 RX ADMIN — Medication 100 MILLIEQUIVALENT(S): at 21:15

## 2017-06-15 RX ADMIN — Medication 100 MILLIEQUIVALENT(S): at 17:43

## 2017-06-15 RX ADMIN — Medication 0.12 MILLIGRAM(S): at 05:10

## 2017-06-15 RX ADMIN — Medication 100 MILLIEQUIVALENT(S): at 13:30

## 2017-06-15 RX ADMIN — Medication 100 MILLIEQUIVALENT(S): at 22:40

## 2017-06-15 RX ADMIN — Medication 100 MILLIEQUIVALENT(S): at 22:05

## 2017-06-15 RX ADMIN — Medication 75 MILLIGRAM(S): at 05:10

## 2017-06-15 RX ADMIN — Medication 100 MILLIEQUIVALENT(S): at 17:10

## 2017-06-15 RX ADMIN — Medication 100 MILLIEQUIVALENT(S): at 20:30

## 2017-06-15 NOTE — BRIEF OPERATIVE NOTE - POST-OP DX
MR (mitral regurgitation)  06/15/2017    Active  Saul Morocho  MS (mitral stenosis)  06/15/2017    Active  Saul Morocho

## 2017-06-15 NOTE — PROVIDER CONTACT NOTE (OTHER) - ASSESSMENT
Patient aox4, stable, denies any discomfort, vs wdl, patient just came out of bathroom after shower.

## 2017-06-15 NOTE — AIRWAY REMOVAL NOTE  ADULT & PEDS - ARTIFICAL AIRWAY REMOVAL COMMENTS
Written order for extubation verified. The patient was identified by full name and birth date compared to the identification band. Present during the procedure was Aileen RRT

## 2017-06-16 DIAGNOSIS — J95.89 OTHER POSTPROCEDURAL COMPLICATIONS AND DISORDERS OF RESPIRATORY SYSTEM, NOT ELSEWHERE CLASSIFIED: ICD-10-CM

## 2017-06-16 DIAGNOSIS — Z29.9 ENCOUNTER FOR PROPHYLACTIC MEASURES, UNSPECIFIED: ICD-10-CM

## 2017-06-16 DIAGNOSIS — I34.0 NONRHEUMATIC MITRAL (VALVE) INSUFFICIENCY: ICD-10-CM

## 2017-06-16 LAB
ALBUMIN SERPL ELPH-MCNC: 4.5 G/DL — SIGNIFICANT CHANGE UP (ref 3.3–5)
ALP SERPL-CCNC: 26 U/L — LOW (ref 40–120)
ALT FLD-CCNC: 16 U/L RC — SIGNIFICANT CHANGE UP (ref 10–45)
ANION GAP SERPL CALC-SCNC: 16 MMOL/L — SIGNIFICANT CHANGE UP (ref 5–17)
APTT BLD: 27.6 SEC — SIGNIFICANT CHANGE UP (ref 27.5–37.4)
AST SERPL-CCNC: 57 U/L — HIGH (ref 10–40)
BILIRUB SERPL-MCNC: 2.2 MG/DL — HIGH (ref 0.2–1.2)
BUN SERPL-MCNC: 23 MG/DL — SIGNIFICANT CHANGE UP (ref 7–23)
CALCIUM SERPL-MCNC: 9.4 MG/DL — SIGNIFICANT CHANGE UP (ref 8.4–10.5)
CHLORIDE SERPL-SCNC: 101 MMOL/L — SIGNIFICANT CHANGE UP (ref 96–108)
CO2 SERPL-SCNC: 25 MMOL/L — SIGNIFICANT CHANGE UP (ref 22–31)
CREAT SERPL-MCNC: 1.04 MG/DL — SIGNIFICANT CHANGE UP (ref 0.5–1.3)
GAS PNL BLDA: SIGNIFICANT CHANGE UP
GLUCOSE SERPL-MCNC: 140 MG/DL — HIGH (ref 70–99)
HCT VFR BLD CALC: 29.5 % — LOW (ref 34.5–45)
HGB BLD-MCNC: 10.4 G/DL — LOW (ref 11.5–15.5)
INR BLD: 1.33 RATIO — HIGH (ref 0.88–1.16)
MCHC RBC-ENTMCNC: 34 PG — SIGNIFICANT CHANGE UP (ref 27–34)
MCHC RBC-ENTMCNC: 35.2 GM/DL — SIGNIFICANT CHANGE UP (ref 32–36)
MCV RBC AUTO: 96.6 FL — SIGNIFICANT CHANGE UP (ref 80–100)
PHOSPHATE SERPL-MCNC: 4.2 MG/DL — SIGNIFICANT CHANGE UP (ref 2.5–4.5)
PLATELET # BLD AUTO: 115 K/UL — LOW (ref 150–400)
POTASSIUM SERPL-MCNC: 4.1 MMOL/L — SIGNIFICANT CHANGE UP (ref 3.5–5.3)
POTASSIUM SERPL-SCNC: 4.1 MMOL/L — SIGNIFICANT CHANGE UP (ref 3.5–5.3)
PROT SERPL-MCNC: 6.3 G/DL — SIGNIFICANT CHANGE UP (ref 6–8.3)
PROTHROM AB SERPL-ACNC: 14.5 SEC — HIGH (ref 9.8–12.7)
RBC # BLD: 3.06 M/UL — LOW (ref 3.8–5.2)
RBC # FLD: 11.4 % — SIGNIFICANT CHANGE UP (ref 10.3–14.5)
SODIUM SERPL-SCNC: 142 MMOL/L — SIGNIFICANT CHANGE UP (ref 135–145)
WBC # BLD: 11.7 K/UL — HIGH (ref 3.8–10.5)
WBC # FLD AUTO: 11.7 K/UL — HIGH (ref 3.8–10.5)

## 2017-06-16 PROCEDURE — 93010 ELECTROCARDIOGRAM REPORT: CPT

## 2017-06-16 PROCEDURE — 71010: CPT | Mod: 26

## 2017-06-16 RX ORDER — METOPROLOL TARTRATE 50 MG
12.5 TABLET ORAL EVERY 12 HOURS
Qty: 0 | Refills: 0 | Status: DISCONTINUED | OUTPATIENT
Start: 2017-06-16 | End: 2017-06-17

## 2017-06-16 RX ORDER — METOCLOPRAMIDE HCL 10 MG
10 TABLET ORAL EVERY 8 HOURS
Qty: 0 | Refills: 0 | Status: DISCONTINUED | OUTPATIENT
Start: 2017-06-16 | End: 2017-06-16

## 2017-06-16 RX ORDER — HYDROMORPHONE HYDROCHLORIDE 2 MG/ML
4 INJECTION INTRAMUSCULAR; INTRAVENOUS; SUBCUTANEOUS EVERY 4 HOURS
Qty: 0 | Refills: 0 | Status: DISCONTINUED | OUTPATIENT
Start: 2017-06-16 | End: 2017-06-21

## 2017-06-16 RX ORDER — POTASSIUM CHLORIDE 20 MEQ
10 PACKET (EA) ORAL
Qty: 0 | Refills: 0 | Status: COMPLETED | OUTPATIENT
Start: 2017-06-16 | End: 2017-06-16

## 2017-06-16 RX ORDER — HYDROMORPHONE HYDROCHLORIDE 2 MG/ML
2 INJECTION INTRAMUSCULAR; INTRAVENOUS; SUBCUTANEOUS EVERY 4 HOURS
Qty: 0 | Refills: 0 | Status: DISCONTINUED | OUTPATIENT
Start: 2017-06-16 | End: 2017-06-21

## 2017-06-16 RX ORDER — INSULIN LISPRO 100/ML
VIAL (ML) SUBCUTANEOUS
Qty: 0 | Refills: 0 | Status: DISCONTINUED | OUTPATIENT
Start: 2017-06-16 | End: 2017-06-18

## 2017-06-16 RX ORDER — SODIUM CHLORIDE 9 MG/ML
3 INJECTION INTRAMUSCULAR; INTRAVENOUS; SUBCUTANEOUS EVERY 8 HOURS
Qty: 0 | Refills: 0 | Status: DISCONTINUED | OUTPATIENT
Start: 2017-06-16 | End: 2017-06-21

## 2017-06-16 RX ORDER — HEPARIN SODIUM 5000 [USP'U]/ML
5000 INJECTION INTRAVENOUS; SUBCUTANEOUS EVERY 8 HOURS
Qty: 0 | Refills: 0 | Status: DISCONTINUED | OUTPATIENT
Start: 2017-06-16 | End: 2017-06-21

## 2017-06-16 RX ORDER — POLYETHYLENE GLYCOL 3350 17 G/17G
17 POWDER, FOR SOLUTION ORAL DAILY
Qty: 0 | Refills: 0 | Status: DISCONTINUED | OUTPATIENT
Start: 2017-06-16 | End: 2017-06-21

## 2017-06-16 RX ORDER — WARFARIN SODIUM 2.5 MG/1
5 TABLET ORAL ONCE
Qty: 0 | Refills: 0 | Status: COMPLETED | OUTPATIENT
Start: 2017-06-16 | End: 2017-06-16

## 2017-06-16 RX ORDER — PANTOPRAZOLE SODIUM 20 MG/1
40 TABLET, DELAYED RELEASE ORAL
Qty: 0 | Refills: 0 | Status: DISCONTINUED | OUTPATIENT
Start: 2017-06-16 | End: 2017-06-21

## 2017-06-16 RX ORDER — DOCUSATE SODIUM 100 MG
100 CAPSULE ORAL THREE TIMES A DAY
Qty: 0 | Refills: 0 | Status: DISCONTINUED | OUTPATIENT
Start: 2017-06-16 | End: 2017-06-21

## 2017-06-16 RX ADMIN — Medication 100 MILLIGRAM(S): at 07:59

## 2017-06-16 RX ADMIN — Medication 100 MILLIGRAM(S): at 16:17

## 2017-06-16 RX ADMIN — HEPARIN SODIUM 5000 UNIT(S): 5000 INJECTION INTRAVENOUS; SUBCUTANEOUS at 13:38

## 2017-06-16 RX ADMIN — PANTOPRAZOLE SODIUM 40 MILLIGRAM(S): 20 TABLET, DELAYED RELEASE ORAL at 11:10

## 2017-06-16 RX ADMIN — WARFARIN SODIUM 5 MILLIGRAM(S): 2.5 TABLET ORAL at 21:31

## 2017-06-16 RX ADMIN — Medication 12.5 MILLIGRAM(S): at 17:11

## 2017-06-16 RX ADMIN — Medication 100 MILLIGRAM(S): at 00:15

## 2017-06-16 RX ADMIN — Medication 10 MILLIGRAM(S): at 21:35

## 2017-06-16 RX ADMIN — Medication 100 MILLIEQUIVALENT(S): at 07:01

## 2017-06-16 RX ADMIN — Medication 2: at 13:38

## 2017-06-16 RX ADMIN — Medication 2: at 18:37

## 2017-06-16 RX ADMIN — HEPARIN SODIUM 5000 UNIT(S): 5000 INJECTION INTRAVENOUS; SUBCUTANEOUS at 21:35

## 2017-06-16 RX ADMIN — HYDROMORPHONE HYDROCHLORIDE 4 MILLIGRAM(S): 2 INJECTION INTRAMUSCULAR; INTRAVENOUS; SUBCUTANEOUS at 16:17

## 2017-06-16 RX ADMIN — Medication 100 MILLIEQUIVALENT(S): at 06:15

## 2017-06-16 RX ADMIN — SODIUM CHLORIDE 3 MILLILITER(S): 9 INJECTION INTRAMUSCULAR; INTRAVENOUS; SUBCUTANEOUS at 13:39

## 2017-06-16 RX ADMIN — Medication 10 MILLIGRAM(S): at 05:13

## 2017-06-16 RX ADMIN — Medication 100 MILLIGRAM(S): at 21:31

## 2017-06-16 RX ADMIN — POLYETHYLENE GLYCOL 3350 17 GRAM(S): 17 POWDER, FOR SOLUTION ORAL at 16:18

## 2017-06-16 RX ADMIN — Medication 81 MILLIGRAM(S): at 11:10

## 2017-06-16 RX ADMIN — Medication 100 MILLIGRAM(S): at 13:38

## 2017-06-16 RX ADMIN — Medication 10 MILLIGRAM(S): at 15:19

## 2017-06-16 RX ADMIN — Medication 100 MILLIEQUIVALENT(S): at 08:01

## 2017-06-16 RX ADMIN — SODIUM CHLORIDE 3 MILLILITER(S): 9 INJECTION INTRAMUSCULAR; INTRAVENOUS; SUBCUTANEOUS at 21:38

## 2017-06-16 RX ADMIN — HYDROMORPHONE HYDROCHLORIDE 4 MILLIGRAM(S): 2 INJECTION INTRAMUSCULAR; INTRAVENOUS; SUBCUTANEOUS at 16:47

## 2017-06-16 NOTE — PHYSICAL THERAPY INITIAL EVALUATION ADULT - ADDITIONAL COMMENTS
Pt resides in apt with spouse & 2 dtr's, about 3 steps to enter. PTA independent in mobility and ADL's, works as H[employer].

## 2017-06-16 NOTE — PROGRESS NOTE ADULT - SUBJECTIVE AND OBJECTIVE BOX
JOSH LUX  MRN#: [id number]  Subjective:  Patient was seen and evalauted on AM rounds offerring no specific compliants at this time.    OBJECTIVE:  ICU Vital Signs Last 24 Hrs  T(C): 36.9, Max: 37.8 (06-15 @ 22:00)  T(F): 98.4, Max: 100 (06-15 @ 22:00)  HR: 68 (60 - 68)  BP: --  BP(mean): --  ABP: 112/49 (102/46 - 142/61)  ABP(mean): 68 (65 - 96)  RR: 20 (9 - 30)  SpO2: 99% (97% - 100%)    I & Os for 24h ending  @ 07:00  =============================================  IN: 2346.4 ml / OUT: 2360 ml / NET: -13.6 ml    I & Os for current day (as of  @ 10:07)  =============================================  IN: 122 ml / OUT: 100 ml / NET: 22 ml    CAPILLARY BLOOD GLUCOSE  135 (2017 08:00)      PHYSICAL EXAM:Daily     Daily Weight in k (2017 00:00)  General: WN/WD NAD    HEENT:     + NCAT  + EOMI  - Conjuctival edema   - Icterus   - Thrush   - ETT  - NGT/OGT  Neck:         + FROM    + JVD     - Nodes     - Masses    + Mid-line trachea   - Tracheostomy  Chest:         - Sternal click  - Sternal drainage  + Pacing wires  + Chest tubes  - SubQ emphysema  Lungs:          + CTA   - Rhonchi    - Rales    - Wheezing     - Decreased BS   - Dullness R L  Cardiac:       + S1 + S2    + RRR   - Irregular   - S3  - S4    - Murmurs   - Rub   - Hamman’s sign   Abdomen:    + BS     + Soft    + Non-tender     - Distended    - Organomegaly  - PEG  Extremities:   - Cyanosis U/L   - Clubbing  U/L  - LE/UE Edema   + Capillary refill    + Pulses   Neuro:        + Awake   +  Alert   - Confused   - Lethargic   - Sedated   - Generalized Weakness  Skin:        - Rashes    - Erythema   + Normal incisions   + IV sites intact  - Sacral decubitus    HOSPITAL MEDICATIONS: All mediciations reviewed and analyzed  MEDICATIONS  (STANDING):  insulin Infusion 2Unit(s)/Hr IV Continuous <Continuous>  metoclopramide Injectable 10milliGRAM(s) IV Push every 8 hours  cefuroxime  IVPB 1500milliGRAM(s) IV Intermittent every 8 hours  aspirin enteric coated 81milliGRAM(s) Oral daily  metoclopramide Injectable 10milliGRAM(s) IV Push every 8 hours  heparin  Injectable 5000Unit(s) SubCutaneous every 8 hours  metoprolol 12.5milliGRAM(s) Oral every 12 hours  pantoprazole    Tablet 40milliGRAM(s) Oral before breakfast  insulin lispro (HumaLOG) corrective regimen sliding scale  SubCutaneous three times a day before meals    MEDICATIONS  (PRN):  oxyCODONE  5 mG/acetaminophen 325 mG 1Tablet(s) Oral every 4 hours PRN Mild Pain (1 - 3)  oxyCODONE  5 mG/acetaminophen 325 mG 2Tablet(s) Oral every 6 hours PRN Moderate Pain (4 - 6)      LABS: All Lab data reviewed and analyzed                        10.4   11.7  )-----------( 115      ( 2017 01:54 )             29.5    06-16    142  |  101  |  23  ----------------------------<  140<H>  4.1   |  25  |  1.04    Ca    9.4      2017 01:54  Phos  4.2     06-16  Mg     1.5     06-15    TPro  6.3  /  Alb  4.5  /  TBili  2.2<H>  /  DBili  x   /  AST  57<H>  /  ALT  16  /  AlkPhos  26<L>  06-16    PT/INR - ( 2017 02:25 )   PT: 14.5 sec;   INR: 1.33 ratio         PTT - ( 2017 02:25 )  PTT:27.6 sec LIVER FUNCTIONS - ( 2017 01:54 )  Alb: 4.5 g/dL / Pro: 6.3 g/dL / ALK PHOS: 26 U/L / ALT: 16 U/L RC / AST: 57 U/L / GGT: x           RADIOLOGY: - Reviewed and analyzed

## 2017-06-16 NOTE — PHYSICAL THERAPY INITIAL EVALUATION ADULT - PERTINENT HX OF CURRENT PROBLEM, REHAB EVAL
Pt  is  48 y/o female admitted 6/13/17  PMHx of Afib on Coumadin, MR s/p cath at [facility] in May 2017 with normal coronaries, admitted to [facility] for pre-op MVR evaluation. Last dose of coumadin on Sat 6/10 per pt. Pt currently denies any CP, SOB, N/V/D, fever, chills or palpitations.

## 2017-06-16 NOTE — PROGRESS NOTE ADULT - SUBJECTIVE AND OBJECTIVE BOX
VITAL SIGNS    Telemetry:   SR  60-80 1st degree avb  Vital Signs Last 24 Hrs  T(C): 36.3, Max: 37.8 (06-15 @ 22:00)  T(F): 97.4, Max: 100 (06-15 @ 22:00)  HR: 70 (60 - 70)  BP: 100/64 (100/64 - 100/64)  RR: 18 (9 - 30)  SpO2: 98% (97% - 100%)  Wt(kg): --          I & Os for 24h ending  @ 07:00  =============================================  IN: 2346.4 ml / OUT: 2360 ml / NET: -13.6 ml    I & Os for current day (as of  @ 14:16)  =============================================  IN: 142 ml / OUT: 515 ml / NET: -373 ml       Daily Weight in k (2017 00:00)  Admit Wt: Drug Dosing Weight  Height (cm): 160 (2017 17:30)  Weight (kg): 68 (2017 17:30)  BMI (kg/m2): 26.6 (2017 17:30)  BSA (m2): 1.71 (2017 17:30)    Bilirubin Total, Serum: 2.2 mg/dL ( @ 01:54)    CAPILLARY BLOOD GLUCOSE  134 (2017 13:36)  149 (2017 12:00)  135 (2017 08:00)  129 (2017 07:00)    Drains:        R Pleural  [ x1 ] to clws no leak no crepitus     Pacing Wires        [ x2 ]   Settings:         VVI 50/8                                                 MEDICATIONS  metoclopramide Injectable 10milliGRAM(s) IV Push every 8 hours  cefuroxime  IVPB 1500milliGRAM(s) IV Intermittent every 8 hours  aspirin enteric coated 81milliGRAM(s) Oral daily  oxyCODONE  5 mG/acetaminophen 325 mG 1Tablet(s) Oral every 4 hours PRN  oxyCODONE  5 mG/acetaminophen 325 mG 2Tablet(s) Oral every 6 hours PRN  heparin  Injectable 5000Unit(s) SubCutaneous every 8 hours  metoprolol 12.5milliGRAM(s) Oral every 12 hours  pantoprazole    Tablet 40milliGRAM(s) Oral before breakfast  insulin lispro (HumaLOG) corrective regimen sliding scale  SubCutaneous three times a day before meals  sodium chloride 0.9% lock flush 3milliLiter(s) IV Push every 8 hours  docusate sodium 100milliGRAM(s) Oral three times a day  warfarin 5milliGRAM(s) Oral once      PHYSICAL EXAM    Subjective: " I feel ok"  Neurology: alert and oriented x 3, nonfocal, no gross deficits  CV : s1s1 reg no MRGS  Sternal Wound :   Right chest  CDI , Stable  Lungs: CTA, equal chest expansion,  R pl CT x1 no leak no crepitus   Abdomen: soft, nontender, nondistended, positive bowel sounds, last bowel movement 17  :  voids   Extremities:    +2 BLLE edema,  +dp b/l , no calt tenderness b/l , warm and perfused b/l      LABS      142  |  101  |  23  ----------------------------<  140<H>  4.1   |  25  |  1.04    Ca    9.4      2017 01:54  Phos  4.2     -16  Mg     1.5     06-15    TPro  6.3  /  Alb  4.5  /  TBili  2.2<H>  /  DBili  x   /  AST  57<H>  /  ALT  16  /  AlkPhos  26<L>                                   10.4   11.7  )-----------( 115      ( 2017 01:54 )             29.5          PT/INR - ( 2017 02:25 )   PT: 14.5 sec;   INR: 1.33 ratio         PTT - ( 2017 02:25 )  PTT:27.6 sec           CXR 17 Clear lungs status post interval extubation.       PAST MEDICAL & SURGICAL HISTORY:  Mitral regurgitation  Atrial fibrillation  Status post balloon mitral valvuloplasty:        Physical Therapy Rec:   Home  [  ]   Home w/ PT  [x  ]  Rehab  [  ]    ASSESSMENT  47yHPI:  This is a 46 y/o female with PMHx of Afib on Coumadin, MR s/p cath at [facility] in May 2017 with normal coronaries, admitted to [facility] for pre-op MVR evaluation. Last dose of coumadin on Sat 6/10 per pt. Pt currently denies any CP, SOB, N/V/D, fever, chills or palpitations. (2017 18:16)    S/p       min invasive MVR    , POD #1

## 2017-06-16 NOTE — PROGRESS NOTE ADULT - SUBJECTIVE AND OBJECTIVE BOX
Operation: right minimal access MVR(M)  POD: 1  Narrative: Patient resting comfortably in bed.     Vital Signs Last 24 Hrs  T(C): 37.4, Max: 37.8 (06-15 @ 22:00)  T(F): 99.3, Max: 100 (06-15 @ 22:00)  HR: 63 (60 - 67)  BP: 96/67 (96/67 - 96/67)  BP(mean): --  RR: 14 (9 - 23)  SpO2: 100% (99% - 100%)  I&O's Detail  I & Os for 24h ending 15 Kyrie 2017 07:00  =============================================  IN:    Oral Fluid: 900 ml    heparin  Infusion.: 90 ml    Total IN: 990 ml  ---------------------------------------------  OUT:    Total OUT: 0 ml  ---------------------------------------------  Total NET: 990 ml    I & Os for current day (as of 16 Jun 2017 00:24)  =============================================  IN:    Albumin 5%  - 250 mL: 1250 ml    IV PiggyBack: 750 ml    sodium chloride 0.45%.: 120 ml    Oral Fluid: 50 ml    dexmedetomidine Infusion: 20.4 ml    insulin Infusion: 13 ml    Total IN: 2203.4 ml  ---------------------------------------------  OUT:    Indwelling Catheter - Urethral: 1760 ml    Chest Tube: 170 ml    Nasoenteral Tube: 50 ml    Total OUT: 1980 ml  ---------------------------------------------  Total NET: 223.4 ml      LABS:    MEDICATIONS  (STANDING):  sodium chloride 0.45%. 1000milliLiter(s) IV Continuous <Continuous>  pantoprazole  Injectable 40milliGRAM(s) IV Push daily  potassium chloride  10 mEq/50 mL IVPB 10milliEquivalent(s) IV Intermittent every 1 hour  potassium chloride  10 mEq/50 mL IVPB 10milliEquivalent(s) IV Intermittent every 1 hour  potassium chloride  10 mEq/50 mL IVPB 10milliEquivalent(s) IV Intermittent once  norepinephrine Infusion 0.039MICROgram(s)/kG/Min IV Continuous <Continuous>  insulin Infusion 2Unit(s)/Hr IV Continuous <Continuous>  metoclopramide Injectable 10milliGRAM(s) IV Push every 8 hours  cefuroxime  IVPB 1500milliGRAM(s) IV Intermittent every 8 hours  aspirin enteric coated 81milliGRAM(s) Oral daily    MEDICATIONS  (PRN):      General: A+Ox3, in NAD  Chest: right lateral chest dressing C/D/I  Heart: S1, S2, RRR  Lungs: CTA B/L, without W/R/R  Abdomen: Soft, ND, NT, positive BS  Extremeties: without edema B/L LE, positive DP pulses B/L     Does the patient have a history of CHF: no  -If yes, systolic or diastolic:  -pre-operative EF: 65%    Extubation within 24 hours: yes    Does the patient have a history of kidney disease: no  -give CKD stage if applicable:  -what is patient's baseline Creatinine: 0.92    Beta Blockers contraindicated for the first 24 hours due to vasopressor/inotrpic medication: no  -If on pressors, indication:    Did the patient receive transfusion of blood and/or products: no  -If yes, indication:    DVT PPX: scd    Melissa-operative antibiotics discontinued within 48 hours of CTU admission:  -name/date/time of discontinue  -gerry/6-17-17/00:00    RADIOLOGY & ADDITIONAL TESTS:    Patient care discussed on morning interdisciplinary rounds with CTS team. Operation: right minimal access MVR(M)  POD: 1  Narrative: Patient resting comfortably in bed.     Vital Signs Last 24 Hrs  T(C): 37.4, Max: 37.8 (06-15 @ 22:00)  T(F): 99.3, Max: 100 (06-15 @ 22:00)  HR: 63 (60 - 67)  BP: 96/67 (96/67 - 96/67)  BP(mean): --  RR: 14 (9 - 23)  SpO2: 100% (99% - 100%)  I&O's Detail  I & Os for 24h ending 15 Kyrie 2017 07:00  =============================================  IN:    Oral Fluid: 900 ml    heparin  Infusion.: 90 ml    Total IN: 990 ml  ---------------------------------------------  OUT:    Total OUT: 0 ml  ---------------------------------------------  Total NET: 990 ml    I & Os for current day (as of 16 Jun 2017 00:24)  =============================================  IN:    Albumin 5%  - 250 mL: 1250 ml    IV PiggyBack: 750 ml    sodium chloride 0.45%.: 120 ml    Oral Fluid: 50 ml    dexmedetomidine Infusion: 20.4 ml    insulin Infusion: 13 ml    Total IN: 2203.4 ml  ---------------------------------------------  OUT:    Indwelling Catheter - Urethral: 1760 ml    Chest Tube: 170 ml    Nasoenteral Tube: 50 ml    Total OUT: 1980 ml  ---------------------------------------------  Total NET: 223.4 ml      LABS:                        10.4   11.7  )-----------( 115      ( 16 Jun 2017 01:54 )             29.5     06-16    142  |  101  |  23  ----------------------------<  140<H>  4.1   |  25  |  1.04    Ca    9.4      16 Jun 2017 01:54  Phos  4.2     06-16  Mg     1.5     06-15    TPro  6.3  /  Alb  4.5  /  TBili  2.2<H>  /  DBili  x   /  AST  57<H>  /  ALT  16  /  AlkPhos  26<L>  06-16    PT/INR - ( 16 Jun 2017 02:25 )   PT: 14.5 sec;   INR: 1.33 ratio         PTT - ( 16 Jun 2017 02:25 )  PTT:27.6 sec  ABG - ( 16 Jun 2017 06:02 )  pH: 7.44  /  pCO2: 40    /  pO2: 84    / HCO3: 27    / Base Excess: 3.0   /  SaO2: 97                    MEDICATIONS  (STANDING):  sodium chloride 0.45%. 1000milliLiter(s) IV Continuous <Continuous>  pantoprazole  Injectable 40milliGRAM(s) IV Push daily  potassium chloride  10 mEq/50 mL IVPB 10milliEquivalent(s) IV Intermittent every 1 hour  potassium chloride  10 mEq/50 mL IVPB 10milliEquivalent(s) IV Intermittent every 1 hour  potassium chloride  10 mEq/50 mL IVPB 10milliEquivalent(s) IV Intermittent once  norepinephrine Infusion 0.039MICROgram(s)/kG/Min IV Continuous <Continuous>  insulin Infusion 2Unit(s)/Hr IV Continuous <Continuous>  metoclopramide Injectable 10milliGRAM(s) IV Push every 8 hours  cefuroxime  IVPB 1500milliGRAM(s) IV Intermittent every 8 hours  aspirin enteric coated 81milliGRAM(s) Oral daily    MEDICATIONS  (PRN):      General: A+Ox3, in NAD  Chest: right lateral chest dressing C/D/I  Heart: S1, S2, RRR  Lungs: CTA B/L, without W/R/R  Abdomen: Soft, ND, NT, positive BS  Extremeties: without edema B/L LE, positive DP pulses B/L     Does the patient have a history of CHF: no  -If yes, systolic or diastolic:  -pre-operative EF: 65%    Extubation within 24 hours: yes    Does the patient have a history of kidney disease: no  -give CKD stage if applicable:  -what is patient's baseline Creatinine: 0.92    Beta Blockers contraindicated for the first 24 hours due to vasopressor/inotrpic medication: no  -If on pressors, indication:    Did the patient receive transfusion of blood and/or products: no  -If yes, indication:    DVT PPX: scd    Melissa-operative antibiotics discontinued within 48 hours of CTU admission:  -name/date/time of discontinue  -gerry/6-17-17/00:00    RADIOLOGY & ADDITIONAL TESTS:    Patient care discussed on morning interdisciplinary rounds with CTS team.

## 2017-06-16 NOTE — PHYSICAL THERAPY INITIAL EVALUATION ADULT - GENERAL OBSERVATIONS, REHAB EVAL
Pt received OOB sitting in chair, A&Ox3, following all commands, +chest tube, +external pacer, +supplemental 02; [place] speaking, declining use of  phone, able to speak and understand some English

## 2017-06-16 NOTE — ANESTHESIA FOLLOW-UP NOTE - NSEVALATIONFT_GEN_ALL_CORE
Pt seen and examined. No recall, no new focal neuro deficits, no apparent anesthesia complications at this time. All questions answered.

## 2017-06-16 NOTE — PHYSICAL THERAPY INITIAL EVALUATION ADULT - GAIT DEVIATIONS NOTED, PT EVAL
decreased step length/decreased stride length/decreased weight-shifting ability/decreased maxine/increased time in double stance/decreased velocity of limb motion

## 2017-06-16 NOTE — PHYSICAL THERAPY INITIAL EVALUATION ADULT - DISCHARGE DISPOSITION, PT EVAL
home with home PT for improved strength balance and endurance for activity, assist from family as necessary/home w/ home PT

## 2017-06-17 DIAGNOSIS — R60.0 LOCALIZED EDEMA: ICD-10-CM

## 2017-06-17 LAB
ANION GAP SERPL CALC-SCNC: 11 MMOL/L — SIGNIFICANT CHANGE UP (ref 5–17)
BUN SERPL-MCNC: 20 MG/DL — SIGNIFICANT CHANGE UP (ref 7–23)
CALCIUM SERPL-MCNC: 9.1 MG/DL — SIGNIFICANT CHANGE UP (ref 8.4–10.5)
CHLORIDE SERPL-SCNC: 100 MMOL/L — SIGNIFICANT CHANGE UP (ref 96–108)
CO2 SERPL-SCNC: 29 MMOL/L — SIGNIFICANT CHANGE UP (ref 22–31)
CREAT SERPL-MCNC: 0.72 MG/DL — SIGNIFICANT CHANGE UP (ref 0.5–1.3)
GLUCOSE SERPL-MCNC: 110 MG/DL — HIGH (ref 70–99)
HCT VFR BLD CALC: 33.6 % — LOW (ref 34.5–45)
HGB BLD-MCNC: 11.3 G/DL — LOW (ref 11.5–15.5)
INR BLD: 1.59 RATIO — HIGH (ref 0.88–1.16)
MCHC RBC-ENTMCNC: 33.4 PG — SIGNIFICANT CHANGE UP (ref 27–34)
MCHC RBC-ENTMCNC: 33.7 GM/DL — SIGNIFICANT CHANGE UP (ref 32–36)
MCV RBC AUTO: 99.3 FL — SIGNIFICANT CHANGE UP (ref 80–100)
PLATELET # BLD AUTO: 145 K/UL — LOW (ref 150–400)
POTASSIUM SERPL-MCNC: 4.4 MMOL/L — SIGNIFICANT CHANGE UP (ref 3.5–5.3)
POTASSIUM SERPL-SCNC: 4.4 MMOL/L — SIGNIFICANT CHANGE UP (ref 3.5–5.3)
PROTHROM AB SERPL-ACNC: 17.5 SEC — HIGH (ref 9.8–12.7)
RBC # BLD: 3.38 M/UL — LOW (ref 3.8–5.2)
RBC # FLD: 11.8 % — SIGNIFICANT CHANGE UP (ref 10.3–14.5)
SODIUM SERPL-SCNC: 140 MMOL/L — SIGNIFICANT CHANGE UP (ref 135–145)
WBC # BLD: 15.1 K/UL — HIGH (ref 3.8–10.5)
WBC # FLD AUTO: 15.1 K/UL — HIGH (ref 3.8–10.5)

## 2017-06-17 PROCEDURE — 71010: CPT | Mod: 26

## 2017-06-17 RX ORDER — SORBITOL SOLUTION 70 %
30 SOLUTION, ORAL MISCELLANEOUS DAILY
Qty: 0 | Refills: 0 | Status: DISCONTINUED | OUTPATIENT
Start: 2017-06-17 | End: 2017-06-21

## 2017-06-17 RX ORDER — WARFARIN SODIUM 2.5 MG/1
7.5 TABLET ORAL ONCE
Qty: 0 | Refills: 0 | Status: COMPLETED | OUTPATIENT
Start: 2017-06-17 | End: 2017-06-17

## 2017-06-17 RX ORDER — METOPROLOL TARTRATE 50 MG
12.5 TABLET ORAL EVERY 12 HOURS
Qty: 0 | Refills: 0 | Status: DISCONTINUED | OUTPATIENT
Start: 2017-06-17 | End: 2017-06-19

## 2017-06-17 RX ORDER — SPIRONOLACTONE 25 MG/1
50 TABLET, FILM COATED ORAL DAILY
Qty: 0 | Refills: 0 | Status: DISCONTINUED | OUTPATIENT
Start: 2017-06-17 | End: 2017-06-21

## 2017-06-17 RX ORDER — FUROSEMIDE 40 MG
20 TABLET ORAL DAILY
Qty: 0 | Refills: 0 | Status: DISCONTINUED | OUTPATIENT
Start: 2017-06-17 | End: 2017-06-21

## 2017-06-17 RX ADMIN — Medication 81 MILLIGRAM(S): at 11:28

## 2017-06-17 RX ADMIN — SPIRONOLACTONE 50 MILLIGRAM(S): 25 TABLET, FILM COATED ORAL at 12:36

## 2017-06-17 RX ADMIN — Medication 20 MILLIGRAM(S): at 11:27

## 2017-06-17 RX ADMIN — HYDROMORPHONE HYDROCHLORIDE 4 MILLIGRAM(S): 2 INJECTION INTRAMUSCULAR; INTRAVENOUS; SUBCUTANEOUS at 02:48

## 2017-06-17 RX ADMIN — Medication 30 MILLILITER(S): at 18:51

## 2017-06-17 RX ADMIN — WARFARIN SODIUM 7.5 MILLIGRAM(S): 2.5 TABLET ORAL at 11:31

## 2017-06-17 RX ADMIN — HEPARIN SODIUM 5000 UNIT(S): 5000 INJECTION INTRAVENOUS; SUBCUTANEOUS at 05:32

## 2017-06-17 RX ADMIN — HYDROMORPHONE HYDROCHLORIDE 4 MILLIGRAM(S): 2 INJECTION INTRAMUSCULAR; INTRAVENOUS; SUBCUTANEOUS at 13:12

## 2017-06-17 RX ADMIN — Medication 100 MILLIGRAM(S): at 00:06

## 2017-06-17 RX ADMIN — HYDROMORPHONE HYDROCHLORIDE 4 MILLIGRAM(S): 2 INJECTION INTRAMUSCULAR; INTRAVENOUS; SUBCUTANEOUS at 09:44

## 2017-06-17 RX ADMIN — HYDROMORPHONE HYDROCHLORIDE 4 MILLIGRAM(S): 2 INJECTION INTRAMUSCULAR; INTRAVENOUS; SUBCUTANEOUS at 03:25

## 2017-06-17 RX ADMIN — HYDROMORPHONE HYDROCHLORIDE 4 MILLIGRAM(S): 2 INJECTION INTRAMUSCULAR; INTRAVENOUS; SUBCUTANEOUS at 22:49

## 2017-06-17 RX ADMIN — Medication 100 MILLIGRAM(S): at 13:13

## 2017-06-17 RX ADMIN — PANTOPRAZOLE SODIUM 40 MILLIGRAM(S): 20 TABLET, DELAYED RELEASE ORAL at 05:32

## 2017-06-17 RX ADMIN — Medication 100 MILLIGRAM(S): at 05:31

## 2017-06-17 RX ADMIN — SODIUM CHLORIDE 3 MILLILITER(S): 9 INJECTION INTRAMUSCULAR; INTRAVENOUS; SUBCUTANEOUS at 13:10

## 2017-06-17 RX ADMIN — HYDROMORPHONE HYDROCHLORIDE 4 MILLIGRAM(S): 2 INJECTION INTRAMUSCULAR; INTRAVENOUS; SUBCUTANEOUS at 23:20

## 2017-06-17 RX ADMIN — Medication 12.5 MILLIGRAM(S): at 05:32

## 2017-06-17 RX ADMIN — SODIUM CHLORIDE 3 MILLILITER(S): 9 INJECTION INTRAMUSCULAR; INTRAVENOUS; SUBCUTANEOUS at 05:35

## 2017-06-17 RX ADMIN — HEPARIN SODIUM 5000 UNIT(S): 5000 INJECTION INTRAVENOUS; SUBCUTANEOUS at 13:13

## 2017-06-17 RX ADMIN — Medication 12.5 MILLIGRAM(S): at 17:56

## 2017-06-17 RX ADMIN — SODIUM CHLORIDE 3 MILLILITER(S): 9 INJECTION INTRAMUSCULAR; INTRAVENOUS; SUBCUTANEOUS at 21:30

## 2017-06-17 RX ADMIN — HEPARIN SODIUM 5000 UNIT(S): 5000 INJECTION INTRAVENOUS; SUBCUTANEOUS at 21:33

## 2017-06-17 RX ADMIN — HYDROMORPHONE HYDROCHLORIDE 4 MILLIGRAM(S): 2 INJECTION INTRAMUSCULAR; INTRAVENOUS; SUBCUTANEOUS at 13:42

## 2017-06-17 RX ADMIN — POLYETHYLENE GLYCOL 3350 17 GRAM(S): 17 POWDER, FOR SOLUTION ORAL at 11:28

## 2017-06-17 RX ADMIN — HYDROMORPHONE HYDROCHLORIDE 4 MILLIGRAM(S): 2 INJECTION INTRAMUSCULAR; INTRAVENOUS; SUBCUTANEOUS at 09:14

## 2017-06-17 NOTE — PROGRESS NOTE ADULT - SUBJECTIVE AND OBJECTIVE BOX
VITAL SIGNS    Telemetry:    Vital Signs Last 24 Hrs  T(C): 36.6, Max: 36.7 ( @ 20:38)  T(F): 97.9, Max: 98.1 ( @ 20:38)  HR: 68 (68 - 71)  BP: 105/58 (103/56 - 105/58)  RR: 18 (18 - 18)  SpO2: 95% (95% - 96%)  Wt(kg): --          I & Os for 24h ending  @ 07:00  =============================================  IN: 862 ml / OUT: 2315 ml / NET: -1453 ml    I & Os for current day (as of  @ 12:43)  =============================================  IN: 120 ml / OUT: 0 ml / NET: 120 ml     Daily     Daily Weight in k.8 (2017 07:22)  Admit Wt: Drug Dosing Weight  Height (cm): 160 (2017 17:30)  Weight (kg): 68 (2017 17:30)  BMI (kg/m2): 26.6 (2017 17:30)  BSA (m2): 1.71 (2017 17:30)      CAPILLARY BLOOD GLUCOSE  114 (2017 11:52)  103 (2017 07:22)  144 (2017 21:42)  150 (2017 16:19)  134 (2017 13:36)          Drains:                  R Pleural  +   drained 240cc/24 hrs    Pacing Wires        yes                               Isolated  [ yes ]                        MEDICATIONS  aspirin enteric coated 81milliGRAM(s) Oral daily  heparin  Injectable 5000Unit(s) SubCutaneous every 8 hours  metoprolol 12.5milliGRAM(s) Oral every 12 hours  pantoprazole    Tablet 40milliGRAM(s) Oral before breakfast  insulin lispro (HumaLOG) corrective regimen sliding scale  SubCutaneous three times a day before meals  sodium chloride 0.9% lock flush 3milliLiter(s) IV Push every 8 hours  docusate sodium 100milliGRAM(s) Oral three times a day  HYDROmorphone   Tablet 2milliGRAM(s) Oral every 4 hours PRN  HYDROmorphone   Tablet 4milliGRAM(s) Oral every 4 hours PRN  polyethylene glycol 3350 17Gram(s) Oral daily  furosemide    Tablet 20milliGRAM(s) Oral daily  spironolactone 50milliGRAM(s) Oral daily      PHYSICAL EXAM    Subjective:  My pain has improved  Neurology: alert and oriented x 3, nonfocal, no gross deficits  CV :  RRR  Sternal Wound :  CDI , Stable  Lungs:  CTA  Abdomen: soft, nontender, nondistended, positive bowel sounds, last bowel movement     Extremities:  trace B/L LE edema      LABS      140  |  100  |  20  ----------------------------<  110<H>  4.4   |  29  |  0.72    Ca    9.1      2017 06:14  Phos  4.2         TPro  6.3  /  Alb  4.5  /  TBili  2.2<H>  /  DBili  x   /  AST  57<H>  /  ALT  16  /  AlkPhos  26<L>                                   11.3   15.1  )-----------( 145      ( 2017 06:14 )             33.6          PT/INR - ( 2017 06:14 )   PT: 17.5 sec;   INR: 1.59 ratio         PTT - ( 2017 02:25 )  PTT:27.6 sec           CXR:       PAST MEDICAL & SURGICAL HISTORY:  Mitral regurgitation  Atrial fibrillation  Status post balloon mitral valvuloplasty:  VITAL SIGNS    Telemetry:    Vital Signs Last 24 Hrs  T(C): 36.6, Max: 36.7 ( @ 20:38)  T(F): 97.9, Max: 98.1 ( @ 20:38)  HR: 68 (68 - 71)  BP: 105/58 (103/56 - 105/58)  RR: 18 (18 - 18)  SpO2: 95% (95% - 96%)  Wt(kg): --          I & Os for 24h ending  @ 07:00  =============================================  IN: 862 ml / OUT: 2315 ml / NET: -1453 ml    I & Os for current day (as of  @ 12:43)  =============================================  IN: 120 ml / OUT: 0 ml / NET: 120 ml     Daily     Daily Weight in k.8 (2017 07:22)  Admit Wt: Drug Dosing Weight  Height (cm): 160 (2017 17:30)  Weight (kg): 68 (2017 17:30)  BMI (kg/m2): 26.6 (2017 17:30)  BSA (m2): 1.71 (2017 17:30)      CAPILLARY BLOOD GLUCOSE  114 (2017 11:52)  103 (2017 07:22)  144 (2017 21:42)  150 (2017 16:19)  134 (2017 13:36)          Drains:                  R Pleural  +   drained 240cc/24 hrs    Pacing Wires        yes                               Isolated  [ yes ]                        MEDICATIONS  aspirin enteric coated 81milliGRAM(s) Oral daily  heparin  Injectable 5000Unit(s) SubCutaneous every 8 hours  metoprolol 12.5milliGRAM(s) Oral every 12 hours  pantoprazole    Tablet 40milliGRAM(s) Oral before breakfast  insulin lispro (HumaLOG) corrective regimen sliding scale  SubCutaneous three times a day before meals  sodium chloride 0.9% lock flush 3milliLiter(s) IV Push every 8 hours  docusate sodium 100milliGRAM(s) Oral three times a day  HYDROmorphone   Tablet 2milliGRAM(s) Oral every 4 hours PRN  HYDROmorphone   Tablet 4milliGRAM(s) Oral every 4 hours PRN  polyethylene glycol 3350 17Gram(s) Oral daily  furosemide    Tablet 20milliGRAM(s) Oral daily  spironolactone 50milliGRAM(s) Oral daily      PHYSICAL EXAM    Subjective:  My pain has improved  Neurology: alert and oriented x 3, nonfocal, no gross deficits  CV :  RRR  Lungs:  CTA  Abdomen: soft, nontender, nondistended, positive bowel sounds, last bowel movement     Extremities:  trace B/L LE edema      LABS      140  |  100  |  20  ----------------------------<  110<H>  4.4   |  29  |  0.72    Ca    9.1      2017 06:14  Phos  4.2         TPro  6.3  /  Alb  4.5  /  TBili  2.2<H>  /  DBili  x   /  AST  57<H>  /  ALT  16  /  AlkPhos  26<L>                                   11.3   15.1  )-----------( 145      ( 2017 06:14 )             33.6          PT/INR - ( 2017 06:14 )   PT: 17.5 sec;   INR: 1.59 ratio         PTT - ( 2017 02:25 )  PTT:27.6 sec           CXR:       PAST MEDICAL & SURGICAL HISTORY:  Mitral regurgitation  Atrial fibrillation  Status post balloon mitral valvuloplasty:

## 2017-06-17 NOTE — PROGRESS NOTE ADULT - ASSESSMENT
47 year old female s/p right minimal access MVR(M) on 6/15/17   6/16  Trf to floor  6/17    Diuretics started for LE edema, kept lt chest tube in place for drainage 47 year old female s/p right minimal access MVR(M) minimal invasive on 6/15/17   6/16  Trf to floor  6/17    Diuretics started for LE edema, kept lt chest tube in place for drainage

## 2017-06-18 LAB
ANION GAP SERPL CALC-SCNC: 14 MMOL/L — SIGNIFICANT CHANGE UP (ref 5–17)
APTT BLD: 38.7 SEC — HIGH (ref 27.5–37.4)
BUN SERPL-MCNC: 17 MG/DL — SIGNIFICANT CHANGE UP (ref 7–23)
CALCIUM SERPL-MCNC: 9 MG/DL — SIGNIFICANT CHANGE UP (ref 8.4–10.5)
CHLORIDE SERPL-SCNC: 96 MMOL/L — SIGNIFICANT CHANGE UP (ref 96–108)
CO2 SERPL-SCNC: 28 MMOL/L — SIGNIFICANT CHANGE UP (ref 22–31)
CREAT SERPL-MCNC: 0.69 MG/DL — SIGNIFICANT CHANGE UP (ref 0.5–1.3)
GLUCOSE SERPL-MCNC: 95 MG/DL — SIGNIFICANT CHANGE UP (ref 70–99)
HCT VFR BLD CALC: 36.7 % — SIGNIFICANT CHANGE UP (ref 34.5–45)
HGB BLD-MCNC: 12.7 G/DL — SIGNIFICANT CHANGE UP (ref 11.5–15.5)
INR BLD: 2.19 RATIO — HIGH (ref 0.88–1.16)
MCHC RBC-ENTMCNC: 34.2 PG — HIGH (ref 27–34)
MCHC RBC-ENTMCNC: 34.5 GM/DL — SIGNIFICANT CHANGE UP (ref 32–36)
MCV RBC AUTO: 99.2 FL — SIGNIFICANT CHANGE UP (ref 80–100)
PLATELET # BLD AUTO: 136 K/UL — LOW (ref 150–400)
POTASSIUM SERPL-MCNC: 4.2 MMOL/L — SIGNIFICANT CHANGE UP (ref 3.5–5.3)
POTASSIUM SERPL-SCNC: 4.2 MMOL/L — SIGNIFICANT CHANGE UP (ref 3.5–5.3)
PROTHROM AB SERPL-ACNC: 24 SEC — HIGH (ref 9.8–12.7)
RBC # BLD: 3.7 M/UL — LOW (ref 3.8–5.2)
RBC # FLD: 11.7 % — SIGNIFICANT CHANGE UP (ref 10.3–14.5)
SODIUM SERPL-SCNC: 138 MMOL/L — SIGNIFICANT CHANGE UP (ref 135–145)
WBC # BLD: 11.9 K/UL — HIGH (ref 3.8–10.5)
WBC # FLD AUTO: 11.9 K/UL — HIGH (ref 3.8–10.5)

## 2017-06-18 PROCEDURE — 71010: CPT | Mod: 26

## 2017-06-18 RX ORDER — HYDROMORPHONE HYDROCHLORIDE 2 MG/ML
2 INJECTION INTRAMUSCULAR; INTRAVENOUS; SUBCUTANEOUS ONCE
Qty: 0 | Refills: 0 | Status: DISCONTINUED | OUTPATIENT
Start: 2017-06-18 | End: 2017-06-18

## 2017-06-18 RX ORDER — WARFARIN SODIUM 2.5 MG/1
2.5 TABLET ORAL ONCE
Qty: 0 | Refills: 0 | Status: COMPLETED | OUTPATIENT
Start: 2017-06-18 | End: 2017-06-18

## 2017-06-18 RX ADMIN — PANTOPRAZOLE SODIUM 40 MILLIGRAM(S): 20 TABLET, DELAYED RELEASE ORAL at 05:15

## 2017-06-18 RX ADMIN — Medication 20 MILLIGRAM(S): at 05:16

## 2017-06-18 RX ADMIN — Medication 12.5 MILLIGRAM(S): at 17:56

## 2017-06-18 RX ADMIN — Medication 100 MILLIGRAM(S): at 20:52

## 2017-06-18 RX ADMIN — HYDROMORPHONE HYDROCHLORIDE 2 MILLIGRAM(S): 2 INJECTION INTRAMUSCULAR; INTRAVENOUS; SUBCUTANEOUS at 09:14

## 2017-06-18 RX ADMIN — HYDROMORPHONE HYDROCHLORIDE 2 MILLIGRAM(S): 2 INJECTION INTRAMUSCULAR; INTRAVENOUS; SUBCUTANEOUS at 23:56

## 2017-06-18 RX ADMIN — HEPARIN SODIUM 5000 UNIT(S): 5000 INJECTION INTRAVENOUS; SUBCUTANEOUS at 05:16

## 2017-06-18 RX ADMIN — SODIUM CHLORIDE 3 MILLILITER(S): 9 INJECTION INTRAMUSCULAR; INTRAVENOUS; SUBCUTANEOUS at 05:18

## 2017-06-18 RX ADMIN — Medication 81 MILLIGRAM(S): at 12:44

## 2017-06-18 RX ADMIN — SODIUM CHLORIDE 3 MILLILITER(S): 9 INJECTION INTRAMUSCULAR; INTRAVENOUS; SUBCUTANEOUS at 20:50

## 2017-06-18 RX ADMIN — SPIRONOLACTONE 50 MILLIGRAM(S): 25 TABLET, FILM COATED ORAL at 05:16

## 2017-06-18 RX ADMIN — SODIUM CHLORIDE 3 MILLILITER(S): 9 INJECTION INTRAMUSCULAR; INTRAVENOUS; SUBCUTANEOUS at 12:44

## 2017-06-18 RX ADMIN — HEPARIN SODIUM 5000 UNIT(S): 5000 INJECTION INTRAVENOUS; SUBCUTANEOUS at 12:44

## 2017-06-18 RX ADMIN — WARFARIN SODIUM 2.5 MILLIGRAM(S): 2.5 TABLET ORAL at 20:52

## 2017-06-18 RX ADMIN — Medication 12.5 MILLIGRAM(S): at 05:15

## 2017-06-18 RX ADMIN — HYDROMORPHONE HYDROCHLORIDE 2 MILLIGRAM(S): 2 INJECTION INTRAMUSCULAR; INTRAVENOUS; SUBCUTANEOUS at 08:44

## 2017-06-18 RX ADMIN — HYDROMORPHONE HYDROCHLORIDE 2 MILLIGRAM(S): 2 INJECTION INTRAMUSCULAR; INTRAVENOUS; SUBCUTANEOUS at 21:30

## 2017-06-18 RX ADMIN — HYDROMORPHONE HYDROCHLORIDE 4 MILLIGRAM(S): 2 INJECTION INTRAMUSCULAR; INTRAVENOUS; SUBCUTANEOUS at 05:56

## 2017-06-18 RX ADMIN — HYDROMORPHONE HYDROCHLORIDE 2 MILLIGRAM(S): 2 INJECTION INTRAMUSCULAR; INTRAVENOUS; SUBCUTANEOUS at 20:52

## 2017-06-18 RX ADMIN — HYDROMORPHONE HYDROCHLORIDE 4 MILLIGRAM(S): 2 INJECTION INTRAMUSCULAR; INTRAVENOUS; SUBCUTANEOUS at 06:26

## 2017-06-18 NOTE — PROGRESS NOTE ADULT - ASSESSMENT
47 year old female s/p right minimal access MVR(M) on 6/15/17   6/16  Trf to floor  6/17    Diuretics started for LE edema, kept lt chest tube in place for drainage   6/18 CW lasix 20 daily Aldactone 50 qd D/C pleural CT tub exary to follow   Coumadin 2.5 tonight INR therapeutic

## 2017-06-18 NOTE — PROGRESS NOTE ADULT - SUBJECTIVE AND OBJECTIVE BOX
Subjective: Hello OOB  in chair some pain no acute distress  VITAL SIGNS  Vital Signs Last 24 Hrs  T(C): 36.8, Max: 36.8 ( @ 04:59)  T(F): 98.2, Max: 98.2 ( @ 04:59)  HR: 78 (68 - 84)  BP: 100/66 (95/58 - 106/69)  RR: 18 (18 - 18)  SpO2: 95% (93% - 96%)  Wt(kg): --  on (O2)              Telemetry/Alarms:  NSR 60-90   LVEF: 65%    MEDICATIONS  aspirin enteric coated 81milliGRAM(s) Oral daily  heparin  Injectable 5000Unit(s) SubCutaneous every 8 hours  pantoprazole    Tablet 40milliGRAM(s) Oral before breakfast  insulin lispro (HumaLOG) corrective regimen sliding scale  SubCutaneous three times a day before meals  sodium chloride 0.9% lock flush 3milliLiter(s) IV Push every 8 hours  docusate sodium 100milliGRAM(s) Oral three times a day  HYDROmorphone   Tablet 2milliGRAM(s) Oral every 4 hours PRN  HYDROmorphone   Tablet 4milliGRAM(s) Oral every 4 hours PRN  polyethylene glycol 3350 17Gram(s) Oral daily  furosemide    Tablet 20milliGRAM(s) Oral daily  spironolactone 50milliGRAM(s) Oral daily  sorbitol 70% Solution 30milliLiter(s) Oral daily PRN  metoprolol 12.5milliGRAM(s) Oral every 12 hours  warfarin 2.5milliGRAM(s) Oral once      PHYSICAL EXAM  General: well nourished, well developed, no acute distress  Neurology: alert and oriented x 3, nonfocal, no gross deficits  Respiratory: clear to auscultation bilaterally  CV: regular rate and rhythm, normal S1, S2  Abdomen: soft, nontender, nondistended, positive bowel sounds, last bowel movement   Extremities: warm, well perfused. n+1dema. + DP pulses rt groin cath site CDI  Incisions: Rt thoracotomy,  c/d/i. sternum  stable  Epicardial Wires:    2> / isolated      I & Os for current day (as of  @ 09:23)  =============================================  IN: 940 ml / OUT: 710 ml / NET: 230 ml      Weights:  Daily Weight in k.9 (2017 07:24)  Admit Wt: Drug Dosing Weight  Height (cm): 160 (2017 17:30)  Weight (kg): 68 (2017 17:30)  BMI (kg/m2): 26.6 (2017 17:30)  BSA (m2): 1.71 (2017 17:30)    LABS  -    138  |  96  |  17  ----------------------------<  95  4.2   |  28  |  0.69    Ca    9.0      2017 06:16                                   12.7   11.9  )-----------( 136      ( 2017 06:16 )             36.7          PT/INR - ( 2017 06:16 )   PT: 24.0 sec;   INR: 2.19 ratio         PTT - ( 2017 06:16 )  PTT:38.7 sec    CAPILLARY BLOOD GLUCOSE  100 (2017 07:50)  118 (2017 21:32)  96 (2017 16:07)  114 (2017 11:52)         PAST MEDICAL & SURGICAL HISTORY:  Mitral regurgitation  Atrial fibrillation  Status post balloon mitral valvuloplasty:      PLAN  Neuro: Pain management  Pulm: Encourage coughing, deep breathing and use of incentive spirometry. Wean off supplemental oxygen as able. Daily CXR.   Cardio: Continue Aspirin, Lipitor. Metroprolol 12.5  GI: Tolerating diet. Continue stool softeners.  Renal: Keep negative fluid balance. Lasix 20 dailyu Aldactone 50 QD  Trend BUN/Cr. Supplement electrolytes prn.   : voiding  Vasc:  Coumadin SCDs for DVT prophylaxis  Heme: Stable H/H. Trend CBC daily.   Therapy: PT daily as tolerated.  Tubes/Wires: D/c Pleural tube today  Disposition: Aim to D/C to home on Mon/Tues  Discussed with Cardiothoracic Team at AM rounds.

## 2017-06-19 LAB
ANION GAP SERPL CALC-SCNC: 13 MMOL/L — SIGNIFICANT CHANGE UP (ref 5–17)
APTT BLD: 35.4 SEC — SIGNIFICANT CHANGE UP (ref 27.5–37.4)
BUN SERPL-MCNC: 19 MG/DL — SIGNIFICANT CHANGE UP (ref 7–23)
CALCIUM SERPL-MCNC: 9.4 MG/DL — SIGNIFICANT CHANGE UP (ref 8.4–10.5)
CHLORIDE SERPL-SCNC: 95 MMOL/L — LOW (ref 96–108)
CO2 SERPL-SCNC: 30 MMOL/L — SIGNIFICANT CHANGE UP (ref 22–31)
CREAT SERPL-MCNC: 0.74 MG/DL — SIGNIFICANT CHANGE UP (ref 0.5–1.3)
GLUCOSE SERPL-MCNC: 111 MG/DL — HIGH (ref 70–99)
HCT VFR BLD CALC: 37.7 % — SIGNIFICANT CHANGE UP (ref 34.5–45)
HGB BLD-MCNC: 13.2 G/DL — SIGNIFICANT CHANGE UP (ref 11.5–15.5)
INR BLD: 1.75 RATIO — HIGH (ref 0.88–1.16)
MCHC RBC-ENTMCNC: 34.2 PG — HIGH (ref 27–34)
MCHC RBC-ENTMCNC: 35 GM/DL — SIGNIFICANT CHANGE UP (ref 32–36)
MCV RBC AUTO: 97.8 FL — SIGNIFICANT CHANGE UP (ref 80–100)
PLATELET # BLD AUTO: 160 K/UL — SIGNIFICANT CHANGE UP (ref 150–400)
POTASSIUM SERPL-MCNC: 4.3 MMOL/L — SIGNIFICANT CHANGE UP (ref 3.5–5.3)
POTASSIUM SERPL-SCNC: 4.3 MMOL/L — SIGNIFICANT CHANGE UP (ref 3.5–5.3)
PROTHROM AB SERPL-ACNC: 19.3 SEC — HIGH (ref 9.8–12.7)
RBC # BLD: 3.85 M/UL — SIGNIFICANT CHANGE UP (ref 3.8–5.2)
RBC # FLD: 11.5 % — SIGNIFICANT CHANGE UP (ref 10.3–14.5)
SODIUM SERPL-SCNC: 138 MMOL/L — SIGNIFICANT CHANGE UP (ref 135–145)
WBC # BLD: 8.8 K/UL — SIGNIFICANT CHANGE UP (ref 3.8–10.5)
WBC # FLD AUTO: 8.8 K/UL — SIGNIFICANT CHANGE UP (ref 3.8–10.5)

## 2017-06-19 RX ORDER — METOPROLOL TARTRATE 50 MG
25 TABLET ORAL
Qty: 0 | Refills: 0 | Status: DISCONTINUED | OUTPATIENT
Start: 2017-06-19 | End: 2017-06-21

## 2017-06-19 RX ORDER — WARFARIN SODIUM 2.5 MG/1
5 TABLET ORAL ONCE
Qty: 0 | Refills: 0 | Status: COMPLETED | OUTPATIENT
Start: 2017-06-19 | End: 2017-06-19

## 2017-06-19 RX ADMIN — Medication 81 MILLIGRAM(S): at 13:44

## 2017-06-19 RX ADMIN — Medication 100 MILLIGRAM(S): at 21:53

## 2017-06-19 RX ADMIN — SODIUM CHLORIDE 3 MILLILITER(S): 9 INJECTION INTRAMUSCULAR; INTRAVENOUS; SUBCUTANEOUS at 21:52

## 2017-06-19 RX ADMIN — HYDROMORPHONE HYDROCHLORIDE 2 MILLIGRAM(S): 2 INJECTION INTRAMUSCULAR; INTRAVENOUS; SUBCUTANEOUS at 00:44

## 2017-06-19 RX ADMIN — HYDROMORPHONE HYDROCHLORIDE 4 MILLIGRAM(S): 2 INJECTION INTRAMUSCULAR; INTRAVENOUS; SUBCUTANEOUS at 22:30

## 2017-06-19 RX ADMIN — PANTOPRAZOLE SODIUM 40 MILLIGRAM(S): 20 TABLET, DELAYED RELEASE ORAL at 06:01

## 2017-06-19 RX ADMIN — Medication 12.5 MILLIGRAM(S): at 06:01

## 2017-06-19 RX ADMIN — Medication 100 MILLIGRAM(S): at 06:01

## 2017-06-19 RX ADMIN — Medication 20 MILLIGRAM(S): at 06:01

## 2017-06-19 RX ADMIN — WARFARIN SODIUM 5 MILLIGRAM(S): 2.5 TABLET ORAL at 21:54

## 2017-06-19 RX ADMIN — SPIRONOLACTONE 50 MILLIGRAM(S): 25 TABLET, FILM COATED ORAL at 06:01

## 2017-06-19 RX ADMIN — HYDROMORPHONE HYDROCHLORIDE 4 MILLIGRAM(S): 2 INJECTION INTRAMUSCULAR; INTRAVENOUS; SUBCUTANEOUS at 06:06

## 2017-06-19 RX ADMIN — HYDROMORPHONE HYDROCHLORIDE 4 MILLIGRAM(S): 2 INJECTION INTRAMUSCULAR; INTRAVENOUS; SUBCUTANEOUS at 06:36

## 2017-06-19 RX ADMIN — Medication 12.5 MILLIGRAM(S): at 18:21

## 2017-06-19 RX ADMIN — SODIUM CHLORIDE 3 MILLILITER(S): 9 INJECTION INTRAMUSCULAR; INTRAVENOUS; SUBCUTANEOUS at 06:06

## 2017-06-19 RX ADMIN — Medication 100 MILLIGRAM(S): at 13:44

## 2017-06-19 RX ADMIN — POLYETHYLENE GLYCOL 3350 17 GRAM(S): 17 POWDER, FOR SOLUTION ORAL at 13:44

## 2017-06-19 RX ADMIN — HEPARIN SODIUM 5000 UNIT(S): 5000 INJECTION INTRAVENOUS; SUBCUTANEOUS at 13:45

## 2017-06-19 RX ADMIN — HYDROMORPHONE HYDROCHLORIDE 4 MILLIGRAM(S): 2 INJECTION INTRAMUSCULAR; INTRAVENOUS; SUBCUTANEOUS at 21:54

## 2017-06-19 RX ADMIN — SODIUM CHLORIDE 3 MILLILITER(S): 9 INJECTION INTRAMUSCULAR; INTRAVENOUS; SUBCUTANEOUS at 13:46

## 2017-06-19 NOTE — DISCHARGE NOTE ADULT - MEDICATION SUMMARY - MEDICATIONS TO CHANGE
I will SWITCH the dose or number of times a day I take the medications listed below when I get home from the hospital:    Coumadin 4 mg oral tablet  -- 1 tab(s) by mouth once a day    metoprolol tartrate 50 mg oral tablet  -- 1 tab(s) by mouth every 8 hours

## 2017-06-19 NOTE — DISCHARGE NOTE ADULT - HOME CARE AGENCY
[facility] [phone] for RN assessment and P/T eval, start of care the day after discharge

## 2017-06-19 NOTE — DISCHARGE NOTE ADULT - ADDITIONAL INSTRUCTIONS
Follow up with Dr Silva on Wednesday July 5 at 1000am   follow up with Dr Sandoval [phone] on Sunday for INR/Coumadin dosing please call upon discharge  take all medications as prescribed  no heavy lifting shower daily no creams or lotions on incisions  Call our office [phone] for fever greater then 101 chills or any questions or concerns  follow heart "dos and dont"

## 2017-06-19 NOTE — DISCHARGE NOTE ADULT - PLAN OF CARE
full recovery from minimally invasive mitral valve replacement mechanical Follow up with Dr Silva on Wednesday July 5 at 1000am   follow up with Dr Sandoval [phone] on Sunday for INR/Coumadin dosing please call upon discharge  take all medications as prescribed  no heavy lifting shower daily no creams or lotions on incisions  Call our office [phone] for fever greater then 101 chills or any questions or concerns Follow up with Dr Silva on Wednesday July 5 at 1000am   follow up with Dr Sandoval [phone] on Sunday for INR/Coumadin dosing please call upon discharge  Check INR on Friday send results to our office fax [phone]  take all medications as prescribed  no heavy lifting shower daily no creams or lotions on incisions  Call our office [phone] for fever greater then 101 chills or any questions or concerns

## 2017-06-19 NOTE — DISCHARGE NOTE ADULT - MEDICATION SUMMARY - MEDICATIONS TO TAKE
I will START or STAY ON the medications listed below when I get home from the hospital:    spironolactone 25 mg oral tablet  -- 2 tab(s) by mouth once a day  -- Indication: For water pill    HYDROmorphone 2 mg oral tablet  -- 1 tab(s) by mouth every 4 hours, As needed, Moderate Pain (4 - 6) MDD:6  may take 1-2 tabs PRN q4 as needed for pain  -- Indication: For pain    aspirin 81 mg oral delayed release tablet  -- 1 tab(s) by mouth once a day  -- Indication: For blood thinner    Lovenox 40 mg/0.4 mL injectable solution  -- 40 milligram(s) injectable 2 times a day  -- It is very important that you take or use this exactly as directed.  Do not skip doses or discontinue unless directed by your doctor.    -- Indication: For blood thinner    metoprolol tartrate 25 mg oral tablet  -- 1 tab(s) by mouth 2 times a day  -- Indication: For Heart rate    furosemide 20 mg oral tablet  -- 1 tab(s) by mouth once a day  -- Indication: For water pill    docusate sodium 100 mg oral capsule  -- 1 cap(s) by mouth 3 times a day  -- Indication: For Stool softner    pantoprazole 40 mg oral delayed release tablet  -- 1 tab(s) by mouth once a day (before a meal)  -- Indication: For Antacid I will START or STAY ON the medications listed below when I get home from the hospital:    spironolactone 25 mg oral tablet  -- 2 tab(s) by mouth once a day  -- Indication: For water pill    HYDROmorphone 2 mg oral tablet  -- 1 tab(s) by mouth every 4 hours, As needed, Moderate Pain (4 - 6) MDD:6  may take 1-2 tabs PRN q4 as needed for pain  -- Indication: For pain    aspirin 81 mg oral delayed release tablet  -- 1 tab(s) by mouth once a day  -- Indication: For blood thinner    Lovenox 40 mg/0.4 mL injectable solution  -- 40 milligram(s) injectable 2 times a day  -- It is very important that you take or use this exactly as directed.  Do not skip doses or discontinue unless directed by your doctor.    -- Indication: For blood thinner    Coumadin 5 mg oral tablet  -- 1 tab(s) by mouth once a day  -- Do not take this drug if you are pregnant.  It is very important that you take or use this exactly as directed.  Do not skip doses or discontinue unless directed by your doctor.  Obtain medical advice before taking any non-prescription drugs as some may affect the action of this medication.    -- Indication: For blood thinner    metoprolol tartrate 25 mg oral tablet  -- 1 tab(s) by mouth 2 times a day  -- Indication: For Heart rate    furosemide 20 mg oral tablet  -- 1 tab(s) by mouth once a day  -- Indication: For water pill    docusate sodium 100 mg oral capsule  -- 1 cap(s) by mouth 3 times a day  -- Indication: For Stool softner    pantoprazole 40 mg oral delayed release tablet  -- 1 tab(s) by mouth once a day (before a meal)  -- Indication: For Antacid

## 2017-06-19 NOTE — DISCHARGE NOTE ADULT - PATIENT PORTAL LINK FT
“You can access the [employer] Patient Portal, offered by [facility], by registering with the following website: [email or web address]”

## 2017-06-19 NOTE — PROGRESS NOTE ADULT - ASSESSMENT
47 year old female s/p right minimal access MVR(M) minimal invasive on 6/15/17   6/16  Trf to floor  6/17    Diuretics started for LE edema, kept lt chest tube in place for drainage 47 year old female s/p 6/15/17 right minimal invasive MVR(M) POD #4 Post op Course: Unremarkable  - 6/16 Transferred to 2 [facility]   - 6/17 Fluid overload; started on Diuretics   - AC therapy initiated for mechanical valve

## 2017-06-19 NOTE — PROGRESS NOTE ADULT - SUBJECTIVE AND OBJECTIVE BOX
VITAL SIGNS    Telemetry: NSR 1st degree 80-90  Vital Signs Last 24 Hrs  T(C): 36.7, Max: 36.7 ( @ 05:14)  T(F): 98.1, Max: 98.1 ( @ 13:43)  HR: 86 (83 - 86)  BP: 98/63 (97/62 - 98/63)  RR: 18 (18 - 18)  SpO2: 98% (96% - 98%)  Wt(kg): --          I & Os for 24h ending  @ 07:00  =============================================  IN: 1180 ml / OUT: 0 ml / NET: 1180 ml    I & Os for current day (as of  @ 20:12)  =============================================  IN: 1140 ml / OUT: 900 ml / NET: 240 ml        Daily Weight in k.7 (2017 08:41)  Admit Wt: Drug Dosing Weight  Height (cm): 160 (2017 17:30)  Weight (kg): 68 (2017 17:30)  BMI (kg/m2): 26.6 (2017 17:30)  BSA (m2): 1.71 (2017 17:30)        Pacing Wires; Isolated                      MEDICATIONS  aspirin enteric coated 81milliGRAM(s) Oral daily  heparin  Injectable 5000Unit(s) SubCutaneous every 8 hours  pantoprazole    Tablet 40milliGRAM(s) Oral before breakfast  docusate sodium 100milliGRAM(s) Oral three times a day  HYDROmorphone   Tablet 2milliGRAM(s) Oral every 4 hours PRN  HYDROmorphone   Tablet 4milliGRAM(s) Oral every 4 hours PRN  polyethylene glycol 3350 17Gram(s) Oral daily  furosemide   Tablet 20milliGRAM(s) Oral daily  spironolactone 50milliGRAM(s) Oral daily  sorbitol 70% Solution 30milliLiter(s) Oral daily PRN  metoprolol 12.5milliGRAM(s) Oral every 12 hours  warfarin 5milliGRAM(s) Oral once      PHYSICAL EXAM    Subjective:"Hello"  Neurology: alert and oriented x 3, nonfocal, no gross deficits  CV : (+) S1 and S2, No murmurs, rubs, gallops or clicks   Sternal Wound :  CDI , Stable; PW --> Isolated   Lungs: CTA B/L   Abdomen: soft, nontender, nondistended, positive bowel sounds x 4 quadrants, (+) BM   : Voiding   Extremities:   B/L LE (+) Trace edema; Negative calf tenderness; (+) 2 DP palpable    LABS      138  |  95<L>  |  19  ----------------------------<  111<H>  4.3   |  30  |  0.74    Ca    9.4      2017 04:56                        13.2   8.8   )-----------( 160      ( 2017 04:56 )             37.7          PT/INR - ( 2017 04:56 )   PT: 19.3 sec;   INR: 1.75 ratio         PTT - ( 2017 04:56 )  PTT:35.4 sec        PAST MEDICAL & SURGICAL HISTORY:  Mitral regurgitation  Atrial fibrillation  Status post balloon mitral valvuloplasty:

## 2017-06-19 NOTE — DISCHARGE NOTE ADULT - CARE PROVIDER_API CALL
Zbigniew Silva), Surgery; Thoracic and Cardiac Surgery  [address] [place] [address]  Phone: [phone]  Fax: [phone]

## 2017-06-19 NOTE — DISCHARGE NOTE ADULT - CARE PLAN
Principal Discharge DX:	S/P MVR (mitral valve replacement)  Goal:	full recovery from minimally invasive mitral valve replacement mechanical  Instructions for follow-up, activity and diet:	Follow up with Dr Silva on Wednesday July 5 at 1000am   follow up with Dr Sandoval [phone] on Sunday for INR/Coumadin dosing please call upon discharge  take all medications as prescribed  no heavy lifting shower daily no creams or lotions on incisions  Call our office [phone] for fever greater then 101 chills or any questions or concerns Principal Discharge DX:	S/P MVR (mitral valve replacement)  Goal:	full recovery from minimally invasive mitral valve replacement mechanical  Instructions for follow-up, activity and diet:	Follow up with Dr Silva on Wednesday July 5 at 1000am   follow up with Dr Sandoval [phone] on Sunday for INR/Coumadin dosing please call upon discharge  Check INR on Friday send results to our office fax [phone]  take all medications as prescribed  no heavy lifting shower daily no creams or lotions on incisions  Call our office [phone] for fever greater then 101 chills or any questions or concerns Principal Discharge DX:	S/P MVR (mitral valve replacement)  Goal:	full recovery from minimally invasive mitral valve replacement mechanical  Instructions for follow-up, activity and diet:	Follow up with Dr Silva on Wednesday July 5 at 1000am   follow up with Dr Sandoval [phone] on Sunday for INR/Coumadin dosing please call upon discharge  Check INR on Friday send results to our office fax [phone]  take all medications as prescribed  no heavy lifting shower daily no creams or lotions on incisions  Call our office [phone] for fever greater then 101 chills or any questions or concerns Principal Discharge DX:	S/P MVR (mitral valve replacement)  Goal:	full recovery from minimally invasive mitral valve replacement mechanical  Instructions for follow-up, activity and diet:	Follow up with Dr Silva on Wednesday July 5 at 1000am   follow up with Dr Sandoval [phone] on Sunday for INR/Coumadin dosing please call upon discharge  Check INR on Friday send results to our office fax [phone]  take all medications as prescribed  no heavy lifting shower daily no creams or lotions on incisions  Call our office [phone] for fever greater then 101 chills or any questions or concerns Principal Discharge DX:	S/P MVR (mitral valve replacement)  Goal:	full recovery from minimally invasive mitral valve replacement mechanical  Instructions for follow-up, activity and diet:	Follow up with Dr Silva on Wednesday July 5 at 1000am   follow up with Dr Sandoval [phone] on Sunday for INR/Coumadin dosing please call upon discharge  Check INR on Friday send results to our office fax [phone]  take all medications as prescribed  no heavy lifting shower daily no creams or lotions on incisions  Call our office [phone] for fever greater then 101 chills or any questions or concerns

## 2017-06-19 NOTE — DISCHARGE NOTE ADULT - MEDICATION SUMMARY - MEDICATIONS TO STOP TAKING
I will STOP taking the medications listed below when I get home from the hospital:    digoxin 125 mcg (0.125 mg) oral tablet  -- 1 tab(s) by mouth once a day    metOLazone 5 mg oral tablet  -- 1 tab(s) by mouth twice a week Tue/Sat

## 2017-06-19 NOTE — DISCHARGE NOTE ADULT - NS AS ACTIVITY OBS
Sex allowed/Do not drive or operate machinery/Walking-Indoors allowed/Showering allowed/Do not make important decisions/No Heavy lifting/straining/Stairs allowed

## 2017-06-19 NOTE — DISCHARGE NOTE ADULT - HOSPITAL COURSE
This is a 47 year old female s/p 6/15/17 right minimal invasive MVR(M) unremarkable  Melissa -op   AC therapy on Coumadin initiated for mechanical MVR.  goal INR 2--2.5   6/16 Transferred to 2 [facility]   - 6/17 Hypervolemia started on low dose diuretics.  6/19 chest tube D/C   - AC therapy on Coumadin initiated for mechanical MVR. INR 1.75 on discharge day d.w Dr santos patient will be discharge on Coumadin 5 mg daily  and Lovenox 40 BID until INR therapeutic.

## 2017-06-19 NOTE — DISCHARGE NOTE ADULT - INSTRUCTIONS
low fat low salt diet  weight yourself daily call our office  for weight gain greater then 3 lbs in 24 hours daily shower no creams or lotions on incisions

## 2017-06-20 DIAGNOSIS — E87.70 FLUID OVERLOAD, UNSPECIFIED: ICD-10-CM

## 2017-06-20 DIAGNOSIS — Z79.01 LONG TERM (CURRENT) USE OF ANTICOAGULANTS: ICD-10-CM

## 2017-06-20 LAB
ANION GAP SERPL CALC-SCNC: 12 MMOL/L — SIGNIFICANT CHANGE UP (ref 5–17)
APTT BLD: 35.3 SEC — SIGNIFICANT CHANGE UP (ref 27.5–37.4)
BUN SERPL-MCNC: 17 MG/DL — SIGNIFICANT CHANGE UP (ref 7–23)
CALCIUM SERPL-MCNC: 9.7 MG/DL — SIGNIFICANT CHANGE UP (ref 8.4–10.5)
CHLORIDE SERPL-SCNC: 96 MMOL/L — SIGNIFICANT CHANGE UP (ref 96–108)
CO2 SERPL-SCNC: 28 MMOL/L — SIGNIFICANT CHANGE UP (ref 22–31)
CREAT SERPL-MCNC: 0.83 MG/DL — SIGNIFICANT CHANGE UP (ref 0.5–1.3)
GLUCOSE SERPL-MCNC: 103 MG/DL — HIGH (ref 70–99)
HCT VFR BLD CALC: 40.4 % — SIGNIFICANT CHANGE UP (ref 34.5–45)
HGB BLD-MCNC: 13.7 G/DL — SIGNIFICANT CHANGE UP (ref 11.5–15.5)
INR BLD: 1.7 RATIO — HIGH (ref 0.88–1.16)
MCHC RBC-ENTMCNC: 33 PG — SIGNIFICANT CHANGE UP (ref 27–34)
MCHC RBC-ENTMCNC: 33.8 GM/DL — SIGNIFICANT CHANGE UP (ref 32–36)
MCV RBC AUTO: 97.8 FL — SIGNIFICANT CHANGE UP (ref 80–100)
PLATELET # BLD AUTO: 217 K/UL — SIGNIFICANT CHANGE UP (ref 150–400)
POTASSIUM SERPL-MCNC: 4.8 MMOL/L — SIGNIFICANT CHANGE UP (ref 3.5–5.3)
POTASSIUM SERPL-SCNC: 4.8 MMOL/L — SIGNIFICANT CHANGE UP (ref 3.5–5.3)
PROTHROM AB SERPL-ACNC: 18.6 SEC — HIGH (ref 9.8–12.7)
RBC # BLD: 4.14 M/UL — SIGNIFICANT CHANGE UP (ref 3.8–5.2)
RBC # FLD: 11.4 % — SIGNIFICANT CHANGE UP (ref 10.3–14.5)
SODIUM SERPL-SCNC: 136 MMOL/L — SIGNIFICANT CHANGE UP (ref 135–145)
SURGICAL PATHOLOGY STUDY: SIGNIFICANT CHANGE UP
WBC # BLD: 10 K/UL — SIGNIFICANT CHANGE UP (ref 3.8–10.5)
WBC # FLD AUTO: 10 K/UL — SIGNIFICANT CHANGE UP (ref 3.8–10.5)

## 2017-06-20 RX ORDER — WARFARIN SODIUM 2.5 MG/1
5 TABLET ORAL ONCE
Qty: 0 | Refills: 0 | Status: COMPLETED | OUTPATIENT
Start: 2017-06-20 | End: 2017-06-20

## 2017-06-20 RX ADMIN — SODIUM CHLORIDE 3 MILLILITER(S): 9 INJECTION INTRAMUSCULAR; INTRAVENOUS; SUBCUTANEOUS at 21:16

## 2017-06-20 RX ADMIN — HYDROMORPHONE HYDROCHLORIDE 2 MILLIGRAM(S): 2 INJECTION INTRAMUSCULAR; INTRAVENOUS; SUBCUTANEOUS at 22:05

## 2017-06-20 RX ADMIN — SPIRONOLACTONE 50 MILLIGRAM(S): 25 TABLET, FILM COATED ORAL at 05:49

## 2017-06-20 RX ADMIN — SODIUM CHLORIDE 3 MILLILITER(S): 9 INJECTION INTRAMUSCULAR; INTRAVENOUS; SUBCUTANEOUS at 13:59

## 2017-06-20 RX ADMIN — Medication 81 MILLIGRAM(S): at 11:37

## 2017-06-20 RX ADMIN — Medication 20 MILLIGRAM(S): at 05:49

## 2017-06-20 RX ADMIN — PANTOPRAZOLE SODIUM 40 MILLIGRAM(S): 20 TABLET, DELAYED RELEASE ORAL at 05:49

## 2017-06-20 RX ADMIN — WARFARIN SODIUM 5 MILLIGRAM(S): 2.5 TABLET ORAL at 21:19

## 2017-06-20 RX ADMIN — Medication 100 MILLIGRAM(S): at 05:49

## 2017-06-20 RX ADMIN — HEPARIN SODIUM 5000 UNIT(S): 5000 INJECTION INTRAVENOUS; SUBCUTANEOUS at 21:19

## 2017-06-20 RX ADMIN — HYDROMORPHONE HYDROCHLORIDE 2 MILLIGRAM(S): 2 INJECTION INTRAMUSCULAR; INTRAVENOUS; SUBCUTANEOUS at 12:30

## 2017-06-20 RX ADMIN — HYDROMORPHONE HYDROCHLORIDE 2 MILLIGRAM(S): 2 INJECTION INTRAMUSCULAR; INTRAVENOUS; SUBCUTANEOUS at 11:41

## 2017-06-20 RX ADMIN — Medication 100 MILLIGRAM(S): at 14:00

## 2017-06-20 RX ADMIN — Medication 25 MILLIGRAM(S): at 17:46

## 2017-06-20 RX ADMIN — Medication 100 MILLIGRAM(S): at 21:19

## 2017-06-20 RX ADMIN — SODIUM CHLORIDE 3 MILLILITER(S): 9 INJECTION INTRAMUSCULAR; INTRAVENOUS; SUBCUTANEOUS at 05:48

## 2017-06-20 RX ADMIN — HYDROMORPHONE HYDROCHLORIDE 2 MILLIGRAM(S): 2 INJECTION INTRAMUSCULAR; INTRAVENOUS; SUBCUTANEOUS at 22:45

## 2017-06-20 RX ADMIN — Medication 25 MILLIGRAM(S): at 05:49

## 2017-06-20 NOTE — PROGRESS NOTE ADULT - PROBLEM SELECTOR PLAN 2
- ASA continued for thromboembolism prophylaxis  - Heparin SQ continued for VTE prophylaxis in addition to Venodyne boots pending full anticoagulation for mechanical MVR  - Pepcid maintained for GI bleeding prophylaxis  - Lopressor continued for atrial fibrillation prophylaxis  - Coumadin for mechanical MVR  - Metabolic stability & infection prophylaxis required review and adjustment of regular Insulin sliding scale and gylcemic regimen while following serial glucose levels to help achieve & maintain euglycemia  - Reviewed & addressed surgical site infection prophylaxis regimen
- Continue diuresis on lasix 20 daily and aldactone 50 daily.  - Supplement electrolytes prn.  - Maintain negative fluid balance.

## 2017-06-20 NOTE — PROGRESS NOTE ADULT - SUBJECTIVE AND OBJECTIVE BOX
Subjective: Pt states " " denies any CP, SOB, N/V and palpitations. No acute events overnight.     VITAL SIGNS    Telemetry:    Vital Signs Last 24 Hrs  T(C): 36.8, Max: 36.8 ( @ 20:14)  T(F): 98.2, Max: 98.2 ( @ 20:14)  HR: 82 (82 - 97)  BP: 101/65 (98/63 - 103/70)  RR: 18 (18 - 18)  SpO2: 98% (98% - 99%)  Wt(kg): --            I & Os for current day (as of  @ 09:34)  =============================================  IN: 1380 ml / OUT: 900 ml / NET: 480 ml     Daily     Daily Weight in k.3 (2017 08:00)  Admit Wt: Drug Dosing Weight  Height (cm): 160 (2017 17:30)  Weight (kg): 68 (2017 17:30)  BMI (kg/m2): 26.6 (2017 17:30)  BSA (m2): 1.71 (2017 17:30)      CAPILLARY BLOOD GLUCOSE                          MEDICATIONS  aspirin enteric coated 81milliGRAM(s) Oral daily  heparin  Injectable 5000Unit(s) SubCutaneous every 8 hours  pantoprazole    Tablet 40milliGRAM(s) Oral before breakfast  sodium chloride 0.9% lock flush 3milliLiter(s) IV Push every 8 hours  docusate sodium 100milliGRAM(s) Oral three times a day  HYDROmorphone   Tablet 2milliGRAM(s) Oral every 4 hours PRN  HYDROmorphone   Tablet 4milliGRAM(s) Oral every 4 hours PRN  polyethylene glycol 3350 17Gram(s) Oral daily  furosemide    Tablet 20milliGRAM(s) Oral daily  spironolactone 50milliGRAM(s) Oral daily  sorbitol 70% Solution 30milliLiter(s) Oral daily PRN  metoprolol 25milliGRAM(s) Oral two times a day  warfarin 5milliGRAM(s) Oral once        PHYSICAL EXAM    Neurology: A&Ox3, nonfocal, no gross deficits  CV : RRR+S1S2  Sternal Wound : mini thoracotomy incisions CDI YAS, +PW Isolated  Lungs: B/L BS clear, diminished on R  Abdomen: Soft, NT/ND, +BSx4Q, last BM on  (-)N/V/D  : Voiding without difficulty, bladder non-distended Greer         [  ]  Extremities: B/L LE edema, negative calf tenderness, +PP , SVG incision        LABS      136  |  96  |  17  ----------------------------<  103<H>  4.8   |  28  |  0.83                                     13.7   10.0  )-----------( 217      ( 2017 05:46 )             40.4          PT/INR - ( 2017 05:46 )   PT: 18.6 sec;   INR: 1.70 ratio         PTT - ( 2017 05:46 )  PTT:35.3 sec       CXR:    TTE:       PAST MEDICAL & SURGICAL HISTORY:  Mitral regurgitation  Atrial fibrillation  Status post balloon mitral valvuloplasty:        Physical Therapy Rec:   Home      Discussed with CT Surgery attending. Subjective: Pt states "I'm ok, thank you" denies any CP, SOB, N/V and palpitations. No acute events overnight.     VITAL SIGNS    Telemetry:   SR 1st degree  Vital Signs Last 24 Hrs  T(F): 98.2, Max: 98.2 ( @ 20:14)  HR: 82 (82 - 97)  BP: 101/65 (98/63 - 103/70)  RR: 18 (18 - 18)  SpO2: 98% (98% - 99%) RA     I & Os for current day (as of  @ 09:34)  =============================================  IN: 1380 ml / OUT: 900 ml / NET: 480 ml       Daily Weight in k.3 (2017 08:00)  Admit Wt: Drug Dosing Weight  Height (cm): 160 (2017 17:30)  Weight (kg): 68 (2017 17:30)  BMI (kg/m2): 26.6 (2017 17:30)  BSA (m2): 1.71 (2017 17:30)                      MEDICATIONS  aspirin enteric coated 81milliGRAM(s) Oral daily  heparin  Injectable 5000Unit(s) SubCutaneous every 8 hours  pantoprazole    Tablet 40milliGRAM(s) Oral before breakfast  docusate sodium 100milliGRAM(s) Oral three times a day  HYDROmorphone   Tablet 2milliGRAM(s) Oral every 4 hours PRN  HYDROmorphone   Tablet 4milliGRAM(s) Oral every 4 hours PRN  polyethylene glycol 3350 17Gram(s) Oral daily  furosemide    Tablet 20milliGRAM(s) Oral daily  spironolactone 50milliGRAM(s) Oral daily  sorbitol 70% Solution 30milliLiter(s) Oral daily PRN  metoprolol 25milliGRAM(s) Oral two times a day  warfarin 5milliGRAM(s) Oral once        PHYSICAL EXAM    Neurology: A&Ox3, nonfocal, no gross deficits  CV : RRR+S1S2  Sternal Wound : R mini thoracotomy incisions CDI YAS, +PW Isolated  Lungs: B/L BS clear, diminished on R  Abdomen: Soft, NT/ND, +BSx4Q, last BM on   (-)N/V/D  : Voiding without difficulty, bladder non-distended  Extremities: B/L LE edema, negative calf tenderness, +PP       LABS      136  |  96  |  17  ----------------------------<  103<H>  4.8   |  28  |  0.83                                 13.7   10.0  )-----------( 217      ( 2017 05:46 )             40.4          PT/INR - ( 2017 05:46 )   PT: 18.6 sec;   INR: 1.70 ratio         PTT - ( 2017 05:46 )  PTT:35.3 sec       CXR 17: The heart size is enlarged and unchanged. Patient is status post mitral annuloplasty. There are minimal bibasilar areas of linear atelectasis or scarring. There are no pleural effusions or pneumothorax. Osseous structures are unremarkable         PAST MEDICAL & SURGICAL HISTORY:  Mitral regurgitation  Atrial fibrillation  Status post balloon mitral valvuloplasty:        Physical Therapy Rec:   Home with home PT.    Discussed with CT Surgery attending.

## 2017-06-20 NOTE — PROGRESS NOTE ADULT - ASSESSMENT
47 year old female s/p 6/15/17 right minimal invasive MVR(M) POD #5   Post op Course:  - 6/16 Transferred to [address] [place] [address]   - 6/17 Hypervolemia started on low dose diuretics.  - AC therapy on Coumadin initiated for mechanical MVR.

## 2017-06-20 NOTE — PROGRESS NOTE ADULT - PROBLEM SELECTOR PLAN 3
Diuretics lasix 20QD aldactone 50 QD
- C/W AC therapy with coumadin 5 mg PO tonight. Goal INR 2.5-3.5.  - Daily PT/INR
Diuretics

## 2017-06-20 NOTE — PROGRESS NOTE ADULT - PROBLEM SELECTOR PROBLEM 2
Encounter for preventive measure
Hypervolemia

## 2017-06-21 VITALS
RESPIRATION RATE: 18 BRPM | SYSTOLIC BLOOD PRESSURE: 102 MMHG | HEART RATE: 86 BPM | OXYGEN SATURATION: 98 % | DIASTOLIC BLOOD PRESSURE: 64 MMHG | TEMPERATURE: 98 F

## 2017-06-21 DIAGNOSIS — Z95.2 PRESENCE OF PROSTHETIC HEART VALVE: ICD-10-CM

## 2017-06-21 LAB
ANION GAP SERPL CALC-SCNC: 14 MMOL/L — SIGNIFICANT CHANGE UP (ref 5–17)
BUN SERPL-MCNC: 21 MG/DL — SIGNIFICANT CHANGE UP (ref 7–23)
CALCIUM SERPL-MCNC: 9.2 MG/DL — SIGNIFICANT CHANGE UP (ref 8.4–10.5)
CHLORIDE SERPL-SCNC: 96 MMOL/L — SIGNIFICANT CHANGE UP (ref 96–108)
CO2 SERPL-SCNC: 26 MMOL/L — SIGNIFICANT CHANGE UP (ref 22–31)
CREAT SERPL-MCNC: 0.75 MG/DL — SIGNIFICANT CHANGE UP (ref 0.5–1.3)
GLUCOSE SERPL-MCNC: 101 MG/DL — HIGH (ref 70–99)
HCT VFR BLD CALC: 36 % — SIGNIFICANT CHANGE UP (ref 34.5–45)
HGB BLD-MCNC: 12.7 G/DL — SIGNIFICANT CHANGE UP (ref 11.5–15.5)
INR BLD: 1.75 RATIO — HIGH (ref 0.88–1.16)
MCHC RBC-ENTMCNC: 34.6 PG — HIGH (ref 27–34)
MCHC RBC-ENTMCNC: 35.4 GM/DL — SIGNIFICANT CHANGE UP (ref 32–36)
MCV RBC AUTO: 97.7 FL — SIGNIFICANT CHANGE UP (ref 80–100)
PLATELET # BLD AUTO: 241 K/UL — SIGNIFICANT CHANGE UP (ref 150–400)
POTASSIUM SERPL-MCNC: 4.5 MMOL/L — SIGNIFICANT CHANGE UP (ref 3.5–5.3)
POTASSIUM SERPL-SCNC: 4.5 MMOL/L — SIGNIFICANT CHANGE UP (ref 3.5–5.3)
PROTHROM AB SERPL-ACNC: 19.3 SEC — HIGH (ref 9.8–12.7)
RBC # BLD: 3.68 M/UL — LOW (ref 3.8–5.2)
RBC # FLD: 11.6 % — SIGNIFICANT CHANGE UP (ref 10.3–14.5)
SODIUM SERPL-SCNC: 136 MMOL/L — SIGNIFICANT CHANGE UP (ref 135–145)
WBC # BLD: 10.9 K/UL — HIGH (ref 3.8–10.5)
WBC # FLD AUTO: 10.9 K/UL — HIGH (ref 3.8–10.5)

## 2017-06-21 PROCEDURE — 71046 X-RAY EXAM CHEST 2 VIEWS: CPT

## 2017-06-21 PROCEDURE — C1769: CPT

## 2017-06-21 PROCEDURE — 85610 PROTHROMBIN TIME: CPT

## 2017-06-21 PROCEDURE — 82553 CREATINE MB FRACTION: CPT

## 2017-06-21 PROCEDURE — C1729: CPT

## 2017-06-21 PROCEDURE — 94010 BREATHING CAPACITY TEST: CPT

## 2017-06-21 PROCEDURE — 84480 ASSAY TRIIODOTHYRONINE (T3): CPT

## 2017-06-21 PROCEDURE — 84295 ASSAY OF SERUM SODIUM: CPT

## 2017-06-21 PROCEDURE — 84132 ASSAY OF SERUM POTASSIUM: CPT

## 2017-06-21 PROCEDURE — 31720 CLEARANCE OF AIRWAYS: CPT

## 2017-06-21 PROCEDURE — C1889: CPT

## 2017-06-21 PROCEDURE — 82803 BLOOD GASES ANY COMBINATION: CPT

## 2017-06-21 PROCEDURE — 82565 ASSAY OF CREATININE: CPT

## 2017-06-21 PROCEDURE — 83880 ASSAY OF NATRIURETIC PEPTIDE: CPT

## 2017-06-21 PROCEDURE — 84100 ASSAY OF PHOSPHORUS: CPT

## 2017-06-21 PROCEDURE — C1751: CPT

## 2017-06-21 PROCEDURE — 93880 EXTRACRANIAL BILAT STUDY: CPT

## 2017-06-21 PROCEDURE — 83605 ASSAY OF LACTIC ACID: CPT

## 2017-06-21 PROCEDURE — P9047: CPT

## 2017-06-21 PROCEDURE — 82947 ASSAY GLUCOSE BLOOD QUANT: CPT

## 2017-06-21 PROCEDURE — 82550 ASSAY OF CK (CPK): CPT

## 2017-06-21 PROCEDURE — 84443 ASSAY THYROID STIM HORMONE: CPT

## 2017-06-21 PROCEDURE — 84484 ASSAY OF TROPONIN QUANT: CPT

## 2017-06-21 PROCEDURE — 86900 BLOOD TYPING SEROLOGIC ABO: CPT

## 2017-06-21 PROCEDURE — 93005 ELECTROCARDIOGRAM TRACING: CPT

## 2017-06-21 PROCEDURE — 87640 STAPH A DNA AMP PROBE: CPT

## 2017-06-21 PROCEDURE — 83036 HEMOGLOBIN GLYCOSYLATED A1C: CPT

## 2017-06-21 PROCEDURE — 85730 THROMBOPLASTIN TIME PARTIAL: CPT

## 2017-06-21 PROCEDURE — 83735 ASSAY OF MAGNESIUM: CPT

## 2017-06-21 PROCEDURE — 82435 ASSAY OF BLOOD CHLORIDE: CPT

## 2017-06-21 PROCEDURE — 85014 HEMATOCRIT: CPT

## 2017-06-21 PROCEDURE — 86923 COMPATIBILITY TEST ELECTRIC: CPT

## 2017-06-21 PROCEDURE — 86901 BLOOD TYPING SEROLOGIC RH(D): CPT

## 2017-06-21 PROCEDURE — 71045 X-RAY EXAM CHEST 1 VIEW: CPT

## 2017-06-21 PROCEDURE — 86850 RBC ANTIBODY SCREEN: CPT

## 2017-06-21 PROCEDURE — 84702 CHORIONIC GONADOTROPIN TEST: CPT

## 2017-06-21 PROCEDURE — 80053 COMPREHEN METABOLIC PANEL: CPT

## 2017-06-21 PROCEDURE — P9045: CPT

## 2017-06-21 PROCEDURE — P9016: CPT

## 2017-06-21 PROCEDURE — 85384 FIBRINOGEN ACTIVITY: CPT

## 2017-06-21 PROCEDURE — 81001 URINALYSIS AUTO W/SCOPE: CPT

## 2017-06-21 PROCEDURE — 80048 BASIC METABOLIC PNL TOTAL CA: CPT

## 2017-06-21 PROCEDURE — 84436 ASSAY OF TOTAL THYROXINE: CPT

## 2017-06-21 PROCEDURE — 97162 PT EVAL MOD COMPLEX 30 MIN: CPT

## 2017-06-21 PROCEDURE — 88305 TISSUE EXAM BY PATHOLOGIST: CPT

## 2017-06-21 PROCEDURE — 87641 MR-STAPH DNA AMP PROBE: CPT

## 2017-06-21 PROCEDURE — 85027 COMPLETE CBC AUTOMATED: CPT

## 2017-06-21 PROCEDURE — 82330 ASSAY OF CALCIUM: CPT

## 2017-06-21 PROCEDURE — 94002 VENT MGMT INPAT INIT DAY: CPT

## 2017-06-21 PROCEDURE — 85576 BLOOD PLATELET AGGREGATION: CPT

## 2017-06-21 RX ORDER — ENOXAPARIN SODIUM 100 MG/ML
40 INJECTION SUBCUTANEOUS
Qty: 0 | Refills: 0 | Status: DISCONTINUED | OUTPATIENT
Start: 2017-06-21 | End: 2017-06-21

## 2017-06-21 RX ORDER — WARFARIN SODIUM 2.5 MG/1
1 TABLET ORAL
Qty: 30 | Refills: 0
Start: 2017-06-21 | End: 2017-07-21

## 2017-06-21 RX ORDER — ASPIRIN/CALCIUM CARB/MAGNESIUM 324 MG
1 TABLET ORAL
Qty: 30 | Refills: 0
Start: 2017-06-21 | End: 2017-07-21

## 2017-06-21 RX ORDER — FUROSEMIDE 40 MG
1 TABLET ORAL
Qty: 5 | Refills: 0
Start: 2017-06-21 | End: 2017-06-26

## 2017-06-21 RX ORDER — HYDROMORPHONE HYDROCHLORIDE 2 MG/ML
1 INJECTION INTRAMUSCULAR; INTRAVENOUS; SUBCUTANEOUS
Qty: 30 | Refills: 0
Start: 2017-06-21 | End: 2017-06-26

## 2017-06-21 RX ORDER — METOPROLOL TARTRATE 50 MG
1 TABLET ORAL
Qty: 60 | Refills: 0
Start: 2017-06-21 | End: 2017-07-21

## 2017-06-21 RX ORDER — ENOXAPARIN SODIUM 100 MG/ML
40 INJECTION SUBCUTANEOUS
Qty: 10 | Refills: 0
Start: 2017-06-21 | End: 2017-06-26

## 2017-06-21 RX ORDER — DOCUSATE SODIUM 100 MG
1 CAPSULE ORAL
Qty: 90 | Refills: 0
Start: 2017-06-21 | End: 2017-07-21

## 2017-06-21 RX ORDER — METOPROLOL TARTRATE 50 MG
1 TABLET ORAL
Qty: 0 | Refills: 0 | COMMUNITY

## 2017-06-21 RX ORDER — PANTOPRAZOLE SODIUM 20 MG/1
1 TABLET, DELAYED RELEASE ORAL
Qty: 30 | Refills: 0
Start: 2017-06-21 | End: 2017-07-21

## 2017-06-21 RX ORDER — SPIRONOLACTONE 25 MG/1
2 TABLET, FILM COATED ORAL
Qty: 10 | Refills: 0
Start: 2017-06-21 | End: 2017-06-26

## 2017-06-21 RX ADMIN — PANTOPRAZOLE SODIUM 40 MILLIGRAM(S): 20 TABLET, DELAYED RELEASE ORAL at 05:45

## 2017-06-21 RX ADMIN — Medication 20 MILLIGRAM(S): at 12:12

## 2017-06-21 RX ADMIN — SODIUM CHLORIDE 3 MILLILITER(S): 9 INJECTION INTRAMUSCULAR; INTRAVENOUS; SUBCUTANEOUS at 05:43

## 2017-06-21 RX ADMIN — Medication 81 MILLIGRAM(S): at 12:13

## 2017-06-21 RX ADMIN — Medication 100 MILLIGRAM(S): at 05:45

## 2017-06-21 RX ADMIN — HEPARIN SODIUM 5000 UNIT(S): 5000 INJECTION INTRAVENOUS; SUBCUTANEOUS at 05:45

## 2017-06-21 RX ADMIN — Medication 25 MILLIGRAM(S): at 05:45

## 2017-06-21 RX ADMIN — SPIRONOLACTONE 50 MILLIGRAM(S): 25 TABLET, FILM COATED ORAL at 12:11

## 2017-06-21 RX ADMIN — ENOXAPARIN SODIUM 40 MILLIGRAM(S): 100 INJECTION SUBCUTANEOUS at 12:30

## 2017-06-21 NOTE — PROGRESS NOTE ADULT - PROBLEM SELECTOR PLAN 1
- Respiratory status required supplemental oxygen & the following of continuous pulse oximetry for support & to prevent decompensation  - Continued early mobilization as tolerated  - Addressed analgesic regimen to optimize function
Pain management  Pulm: Encourage coughing, deep breathing and use of incentive spirometry. Wean off supplemental oxygen as able. Keep chest tube today until no drainage  Cardio: Continue Aspirin/ Coumadin- full anticoagulation for mechanical MVR,     Lopressor continued for atrial fibrillation prophylaxis  GI: Tolerating diet. Continue stool softeners.- Pepcid maintained for GI bleeding prophylaxis  Endo: Metabolic stability & infection prophylaxis required review and adjustment of regular Insulin sliding scale and glycemic regimen while following serial glucose levels to help achieve & maintain euglycemia  Renal: Keep negative fluid balance. Trend BUN/Cr. Supplement electrolytes prn.   :  I/Os strict  Vasc: Heparin /SCDs for DVT prophylaxis. C/w Coumadin if INR low by 6/18 will start heparin gtt   Therapy: PT daily as tolerated.  Disposition: Discharge : home with PT when stable   Discussed with Cardiothoracic Team at AM rounds
- Pain management  - Encourage coughing, deep breathing and use of incentive spirometry.   - Wean off supplemental oxygen as tolerate maintain 02 sat >90%.   - Continue Aspirin 81 mg po daily   - Continue with statin  - Continue with  Lopressor 12.5 mg PO BID; up-titrate BB as tolerated   - Continue stool softeners.  - Maintain  negative fluid balance.  - Diuresis   - Supplement electrolytes prn.   - Continue with PUD / DVT prophylaxis  - PT daily as tolerated  - Increase activity as tolerated   - Glycemic control   - AC therapy continue with coumadin 5 mg PO tonight   - PT/INR in AM   Disposition: Discharge : Home once INR is therapeutic     Discussed with Cardiothoracic Team at AM rounds.
Pain management  Pulm: Encourage coughing, deep breathing and use of incentive spirometry. Wean off supplemental oxygen as able.   Cardio: Continue Aspirin/ Coumadin- full anticoagulation for mechanical MVR,     Lopressor continued for atrial fibrillation prophylaxis D/C pleural tube today  GI: Tolerating diet. Continue stool softeners.- Pepcid maintained for GI bleeding prophylaxis  Endo: Metabolic stability & infection prophylaxis required review and adjustment of regular Insulin sliding scale and glycemic regimen while following serial glucose levels to help achieve & maintain euglycemia  Renal: Keep negative fluid balance. Trend BUN/Cr. Supplement electrolytes prn.   :  I/Os strict  Vasc: Heparin /SCDs for DVT prophylaxis. INR 2.14  Coumadin 2.5 tonight  Therapy: PT daily as tolerated.  Disposition: Discharge : home with PT when stable Mon/Tues  Discussed with Cardiothoracic Team at AM rounds
Pain management  Pulm: Encourage coughing, deep breathing and use of incentive spirometry. Wean off supplemental oxygen as able. Keep chest tube today until no drainage  Cardio: Continue Aspirin/ Coumadin- full anticoagulation for mechanical MVR,     Lopressor continued for atrial fibrillation prophylaxis  GI: Tolerating diet. Continue stool softeners.- Pepcid maintained for GI bleeding prophylaxis  Endo: Metabolic stability & infection prophylaxis required review and adjustment of regular Insulin sliding scale and glycemic regimen while following serial glucose levels to help achieve & maintain euglycemia  Renal: Keep negative fluid balance. Trend BUN/Cr. Supplement electrolytes prn.   :  I/Os strict  Vasc: Heparin /SCDs for DVT prophylaxis. C/w Coumadin if INR low by 6/18 will start heparin gtt   Therapy: PT daily as tolerated.  Disposition: Discharge : home with PT when stable   Discussed with Cardiothoracic Team at AM rounds
S/P Mechanical MVR  - C/W Pain management with po dilaudid prn.  - Encourage coughing, deep breathing and use of incentive spirometry.   - Continue Aspirin 81 mg po daily   - Continue with statin  - Continue with beta-blocker; up-titrate BB as tolerated   - Continue stool softeners.  - Continue with PUD / DVT prophylaxis  - Ambulate 4x daily as tolerated and with PT.  -DC PW  Disposition: Discharge Home once INR is therapeutic
1. Coumadin for  MVR  2. GI PPX  3. VTE PPX  4. Blood sugar control  5. pain management  6. restart home meds
Discharge home today  INR on Friday fax result to our office  Coumadin 5,mg qd Lovenox 40 BID
npo after midnight   abx on call to OR  continue heparin gtt to OR  Hibiclens scrub tonight and in am

## 2017-06-21 NOTE — PROGRESS NOTE ADULT - ASSESSMENT
47 year old female s/p 6/15/17 right minimal invasive MVR(M) POD #5   Post op Course:  - 6/16 Transferred to [facility]   - 6/17 Hypervolemia started on low dose diuretics.  - AC therapy on Coumadin initiated for mechanical MVR.   6/21/Started on Lovenox 40 bid INR 1.75  with  5mg coumadin daily   Dr. Her to follow INR/Coumadin outpt

## 2017-06-21 NOTE — PROGRESS NOTE ADULT - PROVIDER SPECIALTY LIST ADULT
CT Surgery
Critical Care

## 2017-06-21 NOTE — PROGRESS NOTE ADULT - PROBLEM SELECTOR PROBLEM 1
Mitral valve insufficiency, unspecified etiology
Other pulmonary insufficiency, not elsewhere classified, following trauma and surgery
Mitral regurgitation
Mitral valve insufficiency, unspecified etiology
S/P MVR (mitral valve replacement)

## 2017-06-21 NOTE — PROGRESS NOTE ADULT - SUBJECTIVE AND OBJECTIVE BOX
Subjective: AxO x3 stable eager for discharge    VITAL SIGNS  Vital Signs Last 24 Hrs  T(C): 36.7, Max: 37 ( @ 20:15)  T(F): 98, Max: 98.6 ( @ 20:15)  HR: 86 (77 - 90)  BP: 102/64 (101/65 - 102/64)  RR: 18 (18 - 18)  SpO2: 98% (97% - 98%)  Wt(kg): --  on (O2)              Telemetry/Alarms:    LVEF:     MEDICATIONS  aspirin enteric coated 81milliGRAM(s) Oral daily  pantoprazole    Tablet 40milliGRAM(s) Oral before breakfast  sodium chloride 0.9% lock flush 3milliLiter(s) IV Push every 8 hours  docusate sodium 100milliGRAM(s) Oral three times a day  HYDROmorphone   Tablet 2milliGRAM(s) Oral every 4 hours PRN  HYDROmorphone   Tablet 4milliGRAM(s) Oral every 4 hours PRN  polyethylene glycol 3350 17Gram(s) Oral daily  furosemide    Tablet 20milliGRAM(s) Oral daily  spironolactone 50milliGRAM(s) Oral daily  sorbitol 70% Solution 30milliLiter(s) Oral daily PRN  metoprolol 25milliGRAM(s) Oral two times a day  enoxaparin Injectable 40milliGRAM(s) SubCutaneous two times a day      PHYSICAL EXAM  General: well nourished, well developed, no acute distress  Neurology: alert and oriented x 3, nonfocal, no gross deficits  Respiratory: clear to auscultation bilaterally  CV: regular rate and rhythm, normal S1, S2  Abdomen: soft, nontender, nondistended, positive bowel sounds, last bowel movement   Extremities: warm, well perfused. no edema. + DP pulses  Incisions right lateral  min invasive MVR  YAS    I & Os for 24h ending  @ 07:00  =============================================  IN: 1140 ml / OUT: 450 ml / NET: 690 ml    I & Os for current day (as of  @ 16:33)  =============================================  IN: 480 ml / OUT: 350 ml / NET: 130 ml      Weights:  Daily     Daily Weight in k.3 (2017 08:11)  Admit Wt: Drug Dosing Weight  Height (cm): 160 (2017 17:30)  Weight (kg): 68 (2017 17:30)  BMI (kg/m2): 26.6 (2017 17:30)  BSA (m2): 1.71 (2017 17:30)    LABS  -    136  |  96  |  21  ----------------------------<  101<H>  4.5   |  26  |  0.75    Ca    9.2      2017 05:44                                   12.7   10.9  )-----------( 241      ( 2017 05:44 )             36.0          PT/INR - ( 2017 05:44 )   PT: 19.3 sec;   INR: 1.75 ratio         PTT - ( 2017 05:46 )  PTT:35.3 sec          PAST MEDICAL & SURGICAL HISTORY:  Mitral regurgitation  Atrial fibrillation  Status post balloon mitral valvuloplasty:        ASSESSMENT  47y Female was admitted on (Date) from (home/other facility) with    Preop course:    On (Date), pt underwent Mitral valve replacement  .    Postoperative Course/Issues:     PLAN  Neuro: Pain management  Pulm: Encourage coughing, deep breathing and use of incentive spirometry. Wean off supplemental oxygen as able. Daily CXR.   Cardio: Continue Aspirin, Lipitor. (BB)  GI: Tolerating diet. Continue stool softeners.  Renal: Keep negative fluid balance. (Diuretics) Trend BUN/Cr. Supplement electrolytes prn.   :   Vasc: Lovenox/SCDs for DVT prophylaxis  Heme: Stable H/H. Trend CBC daily.   Endo:  ID: Off antibiotics. Stable.  Therapy: PT daily as tolerated.  Tubes/Wires:   Disposition: Aim to D/C to home on  Discussed with Cardiothoracic Team at AM rounds.

## 2017-07-03 ENCOUNTER — RECORD ABSTRACTING (OUTPATIENT)
Age: 47
End: 2017-07-03

## 2017-07-03 DIAGNOSIS — I05.1 RHEUMATIC MITRAL INSUFFICIENCY: ICD-10-CM

## 2017-07-03 DIAGNOSIS — I05.0 RHEUMATIC MITRAL STENOSIS: ICD-10-CM

## 2017-07-03 DIAGNOSIS — I50.33 ACUTE ON CHRONIC DIASTOLIC (CONGESTIVE) HEART FAILURE: ICD-10-CM

## 2017-07-03 RX ORDER — WARFARIN SODIUM 5 MG/1
5 TABLET ORAL AT BEDTIME
Refills: 0 | Status: ACTIVE | COMMUNITY

## 2017-07-03 RX ORDER — HYDROMORPHONE HYDROCHLORIDE 2 MG/1
2 TABLET ORAL EVERY 4 HOURS
Refills: 0 | Status: ACTIVE | COMMUNITY

## 2017-07-03 RX ORDER — ASPIRIN 81 MG
81 TABLET, DELAYED RELEASE (ENTERIC COATED) ORAL DAILY
Qty: 1 | Refills: 0 | Status: ACTIVE | COMMUNITY

## 2017-07-03 RX ORDER — METOPROLOL TARTRATE 25 MG/1
25 TABLET, FILM COATED ORAL TWICE DAILY
Refills: 0 | Status: ACTIVE | COMMUNITY

## 2017-07-03 RX ORDER — METOLAZONE 5 MG/1
5 TABLET ORAL
Refills: 0 | Status: DISCONTINUED | COMMUNITY
End: 2017-07-03

## 2017-07-03 RX ORDER — WARFARIN SODIUM 6 MG/1
TABLET ORAL
Refills: 0 | Status: DISCONTINUED | COMMUNITY
End: 2017-07-03

## 2017-07-03 RX ORDER — METOPROLOL TARTRATE 50 MG/1
50 TABLET, FILM COATED ORAL
Refills: 0 | Status: DISCONTINUED | COMMUNITY
End: 2017-07-03

## 2017-07-03 RX ORDER — DIGOXIN 125 MCG
0.12 TABLET ORAL DAILY
Refills: 0 | Status: DISCONTINUED | COMMUNITY
End: 2017-07-03

## 2017-07-03 RX ORDER — DOCUSATE SODIUM 100 MG/1
100 CAPSULE, LIQUID FILLED ORAL 3 TIMES DAILY
Refills: 0 | Status: ACTIVE | COMMUNITY

## 2017-07-06 ENCOUNTER — APPOINTMENT (OUTPATIENT)
Dept: CARDIOTHORACIC SURGERY | Facility: CLINIC | Age: 47
End: 2017-07-06

## 2017-07-06 DIAGNOSIS — I48.91 UNSPECIFIED ATRIAL FIBRILLATION: ICD-10-CM

## 2017-07-06 DIAGNOSIS — Z95.4 PRESENCE OF OTHER HEART-VALVE REPLACEMENT: ICD-10-CM

## 2017-07-06 RX ORDER — SPIRONOLACTONE 25 MG/1
25 TABLET, FILM COATED ORAL DAILY
Refills: 0 | Status: COMPLETED | COMMUNITY
End: 2017-07-06

## 2017-07-06 RX ORDER — ENOXAPARIN SODIUM 40 MG/.4ML
40 INJECTION SUBCUTANEOUS
Refills: 0 | Status: COMPLETED | COMMUNITY
End: 2017-07-06

## 2017-07-06 RX ORDER — PANTOPRAZOLE 40 MG/1
40 TABLET, DELAYED RELEASE ORAL DAILY
Refills: 0 | Status: COMPLETED | COMMUNITY
End: 2017-07-06

## 2017-07-06 RX ORDER — FUROSEMIDE 20 MG/1
20 TABLET ORAL DAILY
Refills: 0 | Status: COMPLETED | COMMUNITY
End: 2017-07-06

## 2019-11-21 ENCOUNTER — TRANSCRIPTION ENCOUNTER (OUTPATIENT)
Age: 49
End: 2019-11-21

## 2020-01-21 NOTE — H&P ADULT - PROBLEM SELECTOR PROBLEM 2
CC/HPI:   Patient here today for follow up for non-diabetes mellitus nail and foot care.  She presents wearing compression stockings today.  She a history of DVTs due to Factor V Leiden mutation.  Patient does mention a black spot on the fifth digit of the left foot toe nail.  She notices it more as the nail grows.  She states it does not hurt and doesn't recall how she got it.  Nature: occasion \"zing\" pain when the hallices are crossed over as when lying in bed   Pain scale:  2/10  Duration/Onset LOV:  11- with Dr. Kaelyn Soto DPM    Location:  Bilateral feet/toenails  Course: staying the same   Aggravating factors:  None   Treatments:  Nail debridement   Other: work:  Patient is retired   PCP:   Marietta Floyd MD     Any new medical problems since last visit? Patient does mention a black spot on the fifth digit of the left foot toe nail.  Notices it more as the nail grows.    Past Medical History:   Diagnosis Date   • Allergy to ACE inhibitors    • CAD (coronary artery disease)     2009 LAD disease, noninterventional    • Contrast media allergy    • Contrast media allergy     HIVES with No swelling of mouth/throat/tongue    • Diaphragmatic hernia    • Dvt femoral (deep venous thrombosis) (CMS/[facility])    • Essential hypertension, benign 10/17/2005   • Factor V Leiden mutation (CMS/[facility]) 1/28/2019   • Hypertension    • Hypothyroid    • Long term (current) use of anticoagulants 01/28/2019    Coumadin for Factor V leiden, DVT and PAF    • Mixed hyperlipidemia 11/23/2004   • Nonsmoker    • HANNAH (obstructive sleep apnea)    • Osteoarthrosis involving lower leg 2/2/2006   • PAF (paroxysmal atrial fibrillation) (CMS/[facility])    • Reflux esophagitis 1/24/2006   • Sleep apnea 10/17/2005   • Statin intolerance    • Venous insufficiency     s/p Left GSV (knee to prox thigh) Clarivein 4/16/2018      Social History     Socioeconomic History   • Marital status: /Civil Union     Spouse name: Not on file   • Number of  children: Not on file   • Years of education: Not on file   • Highest education level: Not on file   Occupational History   • Not on file   Social Needs   • Financial resource strain: Not on file   • Food insecurity:     Worry: Not on file     Inability: Not on file   • Transportation needs:     Medical: Not on file     Non-medical: Not on file   Tobacco Use   • Smoking status: Never Smoker   • Smokeless tobacco: Never Used   Substance and Sexual Activity   • Alcohol use: Yes     Alcohol/week: 4.0 standard drinks     Types: 4 Cans of beer per week   • Drug use: No   • Sexual activity: Not Currently     Partners: Male     Birth control/protection: Post-menopausal   Lifestyle   • Physical activity:     Days per week: Not on file     Minutes per session: Not on file   • Stress: Not on file   Relationships   • Social connections:     Talks on phone: Not on file     Gets together: Not on file     Attends Yazidism service: Not on file     Active member of club or organization: Not on file     Attends meetings of clubs or organizations: Not on file     Relationship status: Not on file   • Intimate partner violence:     Fear of current or ex partner: Not on file     Emotionally abused: Not on file     Physically abused: Not on file     Forced sexual activity: Not on file   Other Topics Concern   • Not on file   Social History Narrative   • Not on file     Family History   Problem Relation Age of Onset   • Other Mother         brain aneurysm   • Stroke/TIA Mother    • Hypertension Mother    • Cancer Father         stomach   • Diabetes Father    • Hypertension Father    • Myocardial Infarction Father      Past Surgical History:   Procedure Laterality Date   • Appendectomy     • Breast biopsy     • Cardiac catherization  2009 2009 LAD disease, noninterventional    • Cholecystectomy     • Dilation and curettage     • Hb endovenus cath directed chem ablat Left 04/16/2018    s/p Left GSV (knee to prox thigh) René  4/16/2018 at [facility] Dr. Nino    • Hemorrhoidectomy     • Hemorrhoidectomy     • Intertrochanteric hip fracture surgery  2017   • Tonsillectomy       Current Outpatient Medications   Medication Sig Dispense Refill   • warfarin (COUMADIN) 1 MG tablet (warfarin) Take 3mg (3 tablets) on Sunday/Friday and 4mg (4 tablets) on all other days of the week 362 tablet 0   • isosorbide mononitrate (IMDUR) 30 MG 24 hr tablet Take 1 tablet by mouth 2 times daily. 180 tablet 3   • risedronate (ACTONEL) 35 MG tablet Take 1 tablet by mouth 1 day a week. MUST BE A WHITE TABLET. ACTAVIS MANUFACTURE. PATIENT IS ALLERGIC TO BLUE DYE AND YELLOW DYE. 12 tablet 2   • traMADol (ULTRAM) 50 MG tablet Take 1 tablet by mouth every 8 hours as needed for Pain. 60 tablet 1   • fluconazole (DIFLUCAN) 150 MG tablet Take one tablet once weekly. 4 tablet 1   • nystatin (MYCOSTATIN) 953921 UNIT/GM cream Apply topically 2 times daily. 60 g 5   • levothyroxine (SYNTHROID, LEVOTHROID) 50 MCG tablet Take 0.5 tablets by mouth daily. 45 tablet 1   • mometasone (ELOCON) 0.1 % cream Apply a thin layer to the opening of the affected ear twice a day for one week, then as needed for itchiness and dryness 15 g 2   • nystatin (MYCOSTATIN) 633966 UNIT/GM powder Apply topically 3 times daily. 60 g 5   • nystatin-triamcinolone (MYCOLOG II) 170298-2.1 UNIT/GM-% ointment Apply topically 2 times daily. For rash for 1 week 30 g 0   • calcium citrate-vitamin D (CITRACAL+D) 315-250 MG-UNIT per tablet Take 1 tablet by mouth daily.     • metoPROLOL succinate (TOPROL-XL) 50 MG 24 hr tablet TAKE 2 TABLETS DAILY 180 tablet 3   • clotrimazole-betamethasone (LOTRISONE) 1-0.05 % cream Apply topically 2 times daily. 30 g 0   • metroNIDAZOLE (METROCREAM) 0.75 % cream Apply to face bid for rosacea 45 g 5   • nitroGLYcerin (NITROSTAT) 0.4 MG sublingual tablet Place 1 tablet under the tongue every 5 minutes as needed for Chest pain. 30 tablet 0   • clopidogrel (PLAVIX) 75 MG tablet Take 1  tablet by mouth daily. 90 tablet 3   • spironolactone (ALDACTONE) 25 MG tablet Please take half a tab every am (Patient taking differently: Indications: patient taking only PRN Please take half a tab every am) 45 tablet 1   • Vitamin D, Cholecalciferol, 1000 units Tab Take  by mouth daily. - Oral     • Cranberry 300 MG Tab Take 300 mg by mouth 2 times daily.      • hydrOXYzine (ATARAX) 25 MG tablet Take 25 mg by mouth every 6 hours as needed.      • loratadine (CLARITIN) 10 MG tablet 1 tablet daily as needed     • MAGNESIUM  mg.     • Probiotic Product (PROBIOTIC DAILY) Cap 1 tab daily       No current facility-administered medications for this visit.      ALLERGIES:   Allergen Reactions   • Ace Inhibitors Other (See Comments)     hives   • Alendronic Acid MYALGIA     Joint and muscle pain   • Aspirin Other (See Comments)     Internal bleeding     • Cephalosporins HIVES   • Contrast Media HIVES   • Iodides HIVES     BLUE & YELLOW DYE     • Lidocaine Other (See Comments)     Lost eyesight   • Niacin MYALGIA     Joint and muscle pain   • Nitrofurantoin HIVES and ANAPHYLAXIS   • Penicillins HIVES   • Phenobarbital Other (See Comments)   • Phenytoin Sodium Extended Other (See Comments)   • Sulfa Antibiotics HIVES   • Acetaminophen-Codeine Other (See Comments)     hallucinating   • Celecoxib Other (See Comments)     Hives or joint pain   • Erythromycin HIVES   • Gabapentin DIARRHEA     abd pain, diarrhea   • Hydralazine Other (See Comments)     Face got red, eye got bothered   • Hydrocodone-Acetaminophen Other (See Comments)     hallucinations   • Iodinated Diagnostic Agents HIVES     No swelling of mouth/throat/tongue   • Lopressor Hct Cough   • Morphine Other (See Comments)     hallucinations   • Ranitidine Other (See Comments)     Upset stomach/churning/burning.  Name brand Zantac is ok.   • Terazosin Hcl SWELLING   • Valsartan Other (See Comments)     Cramping   • Yellow Dye   (Food Or Med) HIVES     # 6 and  #10   • Fosamax MYALGIA     Joint and muscle pain   • Statins Other (See Comments)     Intolerant to multiple statins. Muscle and joint pain.     Social History     Tobacco Use   Smoking Status Never Smoker   Smokeless Tobacco Never Used       Review of Systems   Constitutional: Negative for activity change, appetite change, chills, diaphoresis, fatigue and fever.   HENT: Negative for congestion, ear pain and sore throat.    Eyes: Negative for photophobia and visual disturbance.   Respiratory: Negative for cough, chest tightness and shortness of breath.    Cardiovascular: Negative for chest pain, palpitations and leg swelling.   Gastrointestinal: Negative for abdominal pain, constipation, diarrhea, nausea and vomiting.   Endocrine: Negative for cold intolerance and heat intolerance.   Genitourinary: Negative for decreased urine volume, difficulty urinating and dysuria.   Musculoskeletal: Negative for arthralgias, back pain, gait problem, joint swelling and myalgias.   Skin: Negative for color change, pallor, rash and wound.        Ingrown nails, subungual hematoma left   Allergic/Immunologic: Negative for environmental allergies, food allergies and immunocompromised state.   Neurological: Negative for dizziness, tremors, seizures, syncope, weakness, light-headedness, numbness and headaches.   Hematological: Negative for adenopathy. Does not bruise/bleed easily.        Long term current use of anticoagulant therapy, Factor V Leiden mutation,  History of DVTs   Psychiatric/Behavioral: Negative for agitation, behavioral problems, confusion, decreased concentration and sleep disturbance.       PEDAL PHYSICAL EXAM    Constitutional:  Patient alert and oriented x 3   Good body habitus and hygiene     Dermatological  Nails are long and incurvated 1-5 bilateral with some pressure noted on the distal nail borders from the nails.  Mild pain to palpation over bilateral hallices bilateral borders.  No erythema, no swelling, no  drainage and no signs of infection.  Subungual hematoma noted on the left 5th toe; stable.  No pain to palpation.    Vascular  Dorsalis pedis pulses +1/4, Posterior tibial pulses +1/4, Capillary filling time <3sec    Neurological  Sensation: intact to light touch  DTR: 2/4 Achilles, left and 2/4 Achilles, right    Musculoskeletal  Digital alignment: rectus, non-contributory   Deformities: Dorsally contracted digits 2-4 bilateral with flexion deformity at PIPJ and prominent dorsal joint with no pain.    ASSESSMENT  Ingrown Nails Bilateral  Bilateral toe pain  Subungual Hematoma Left 5th toe  Long term current use of anticoagulant therapy  History of DVTs  Factor V Leiden mutation    PLAN  The patient was clinically examined.  She presents today for nail care.  She has a history of ingrown nails, mostly painful in bilateral great toes.  I trimmed and reduced these today.  She has on her bilateral sukumar hoses.  She states she has a history of DVTs, so she must always have them on.  I discussed performing a P&A matrixectomy; however, due to her history she would just like to have her nails trimmed.    I discussed the ingrown nail(s) for the patient, BILATERAL HALLUX BILATERAL BORDERS.  I stated to the patient that the ingrown nail(s) may return after cutting, and may potentially cause continued pain and infection.  I explained to the patient that they have a painful ingrown nail that I recommend removal of the nail plate(s) partial/full with phenol.  I advised that the phenol and alcohol procedure can be done and can be a permanent removal of the nail, but sometimes reoccurrence can occur which may require repeat P&A procedures.    I also explained that a P&A (phenol & alcohol) matrixectomy procedure can be done in the future when there is no infections present to alleviate the pain from the ingrown nail by narrowing or removing the nail plate and permanently removing the ingrown border(s)/plate. I advised that this can  be done under local anesthetic in the clinic.  I discussed the I&D nail avulsion procedure, aftercare, and possibility of reoccurrence, inclusion cyst or residual deformity of the nail afterwards.     Patient education: We discussed  Pt's diagnosis and treatment options in detail.  Patient advised to contact office or go to nearest ER if infection doesn't resolve or improve.    Procedure note:  Trimmed and reduced all ingrown nails bilateral 1-5 with nail nippers.     RX: none    FOLLOW UP  3 months    Electronically Signed by:    JACOB Jimenez, 1/21/2020     Supervising Physician:   Meño Armenta DPM, 1/21/2020       Atrial fibrillation

## 2021-02-18 PROBLEM — I48.91 UNSPECIFIED ATRIAL FIBRILLATION: Chronic | Status: ACTIVE | Noted: 2017-06-13

## 2021-02-18 PROBLEM — I34.0 NONRHEUMATIC MITRAL (VALVE) INSUFFICIENCY: Chronic | Status: ACTIVE | Noted: 2017-06-13

## 2021-02-23 ENCOUNTER — OUTPATIENT (OUTPATIENT)
Dept: OUTPATIENT SERVICES | Facility: HOSPITAL | Age: 51
LOS: 1 days | End: 2021-02-23
Payer: COMMERCIAL

## 2021-02-23 ENCOUNTER — APPOINTMENT (OUTPATIENT)
Dept: ULTRASOUND IMAGING | Facility: IMAGING CENTER | Age: 51
End: 2021-02-23
Payer: COMMERCIAL

## 2021-02-23 ENCOUNTER — APPOINTMENT (OUTPATIENT)
Dept: MAMMOGRAPHY | Facility: IMAGING CENTER | Age: 51
End: 2021-02-23
Payer: COMMERCIAL

## 2021-02-23 DIAGNOSIS — Z98.890 OTHER SPECIFIED POSTPROCEDURAL STATES: Chronic | ICD-10-CM

## 2021-02-23 DIAGNOSIS — Z00.8 ENCOUNTER FOR OTHER GENERAL EXAMINATION: ICD-10-CM

## 2021-02-23 PROCEDURE — 76641 ULTRASOUND BREAST COMPLETE: CPT

## 2021-02-23 PROCEDURE — 77067 SCR MAMMO BI INCL CAD: CPT | Mod: 26

## 2021-02-23 PROCEDURE — 76641 ULTRASOUND BREAST COMPLETE: CPT | Mod: 26,LT

## 2021-02-23 PROCEDURE — 82565 ASSAY OF CREATININE: CPT

## 2021-02-23 PROCEDURE — 77063 BREAST TOMOSYNTHESIS BI: CPT | Mod: 26

## 2021-03-03 ENCOUNTER — RESULT REVIEW (OUTPATIENT)
Age: 51
End: 2021-03-03

## 2021-03-03 ENCOUNTER — APPOINTMENT (OUTPATIENT)
Dept: ULTRASOUND IMAGING | Facility: IMAGING CENTER | Age: 51
End: 2021-03-03
Payer: COMMERCIAL

## 2021-03-03 ENCOUNTER — OUTPATIENT (OUTPATIENT)
Dept: OUTPATIENT SERVICES | Facility: HOSPITAL | Age: 51
LOS: 1 days | End: 2021-03-03
Payer: COMMERCIAL

## 2021-03-03 DIAGNOSIS — Z00.8 ENCOUNTER FOR OTHER GENERAL EXAMINATION: ICD-10-CM

## 2021-03-03 DIAGNOSIS — Z98.890 OTHER SPECIFIED POSTPROCEDURAL STATES: Chronic | ICD-10-CM

## 2021-03-03 PROCEDURE — 88360 TUMOR IMMUNOHISTOCHEM/MANUAL: CPT

## 2021-03-03 PROCEDURE — 88305 TISSUE EXAM BY PATHOLOGIST: CPT | Mod: 26

## 2021-03-03 PROCEDURE — 88341 IMHCHEM/IMCYTCHM EA ADD ANTB: CPT | Mod: 26,59

## 2021-03-03 PROCEDURE — 19084 BX BREAST ADD LESION US IMAG: CPT

## 2021-03-03 PROCEDURE — 88360 TUMOR IMMUNOHISTOCHEM/MANUAL: CPT | Mod: 26

## 2021-03-03 PROCEDURE — 19083 BX BREAST 1ST LESION US IMAG: CPT | Mod: LT

## 2021-03-03 PROCEDURE — 88342 IMHCHEM/IMCYTCHM 1ST ANTB: CPT | Mod: 26,59

## 2021-03-03 PROCEDURE — 77065 DX MAMMO INCL CAD UNI: CPT | Mod: 26,LT

## 2021-03-03 PROCEDURE — 88341 IMHCHEM/IMCYTCHM EA ADD ANTB: CPT

## 2021-03-03 PROCEDURE — A4648: CPT

## 2021-03-03 PROCEDURE — 77065 DX MAMMO INCL CAD UNI: CPT

## 2021-03-03 PROCEDURE — 88342 IMHCHEM/IMCYTCHM 1ST ANTB: CPT

## 2021-03-03 PROCEDURE — 88305 TISSUE EXAM BY PATHOLOGIST: CPT

## 2021-03-03 PROCEDURE — 19083 BX BREAST 1ST LESION US IMAG: CPT

## 2021-03-03 PROCEDURE — 19084 BX BREAST ADD LESION US IMAG: CPT | Mod: LT

## 2021-03-30 ENCOUNTER — OUTPATIENT (OUTPATIENT)
Dept: OUTPATIENT SERVICES | Facility: HOSPITAL | Age: 51
LOS: 1 days | End: 2021-03-30
Payer: COMMERCIAL

## 2021-03-30 DIAGNOSIS — N62 HYPERTROPHY OF BREAST: ICD-10-CM

## 2021-03-30 DIAGNOSIS — S21.002A UNSPECIFIED OPEN WOUND OF LEFT BREAST, INITIAL ENCOUNTER: ICD-10-CM

## 2021-03-30 DIAGNOSIS — Z85.3 PERSONAL HISTORY OF MALIGNANT NEOPLASM OF BREAST: ICD-10-CM

## 2021-03-30 DIAGNOSIS — Z98.890 OTHER SPECIFIED POSTPROCEDURAL STATES: Chronic | ICD-10-CM

## 2021-03-30 DIAGNOSIS — C50.412 MALIGNANT NEOPLASM OF UPPER-OUTER QUADRANT OF LEFT FEMALE BREAST: ICD-10-CM

## 2021-03-30 PROCEDURE — 88321 CONSLTJ&REPRT SLD PREP ELSWR: CPT

## 2021-03-31 ENCOUNTER — RESULT REVIEW (OUTPATIENT)
Age: 51
End: 2021-03-31

## 2021-03-31 PROCEDURE — 88321 CONSLTJ&REPRT SLD PREP ELSWR: CPT

## 2021-04-19 ENCOUNTER — OUTPATIENT (OUTPATIENT)
Dept: OUTPATIENT SERVICES | Facility: HOSPITAL | Age: 51
LOS: 1 days | End: 2021-04-19
Payer: COMMERCIAL

## 2021-04-19 VITALS
SYSTOLIC BLOOD PRESSURE: 103 MMHG | HEART RATE: 63 BPM | HEIGHT: 61.75 IN | WEIGHT: 143.3 LBS | DIASTOLIC BLOOD PRESSURE: 63 MMHG | OXYGEN SATURATION: 97 % | TEMPERATURE: 98 F | RESPIRATION RATE: 18 BRPM

## 2021-04-19 DIAGNOSIS — Z95.2 PRESENCE OF PROSTHETIC HEART VALVE: Chronic | ICD-10-CM

## 2021-04-19 DIAGNOSIS — Z01.818 ENCOUNTER FOR OTHER PREPROCEDURAL EXAMINATION: ICD-10-CM

## 2021-04-19 DIAGNOSIS — Z85.3 PERSONAL HISTORY OF MALIGNANT NEOPLASM OF BREAST: ICD-10-CM

## 2021-04-19 DIAGNOSIS — C50.412 MALIGNANT NEOPLASM OF UPPER-OUTER QUADRANT OF LEFT FEMALE BREAST: ICD-10-CM

## 2021-04-19 DIAGNOSIS — Z98.890 OTHER SPECIFIED POSTPROCEDURAL STATES: Chronic | ICD-10-CM

## 2021-04-19 DIAGNOSIS — E78.5 HYPERLIPIDEMIA, UNSPECIFIED: Chronic | ICD-10-CM

## 2021-04-19 DIAGNOSIS — I48.91 UNSPECIFIED ATRIAL FIBRILLATION: ICD-10-CM

## 2021-04-19 DIAGNOSIS — S21.002A UNSPECIFIED OPEN WOUND OF LEFT BREAST, INITIAL ENCOUNTER: ICD-10-CM

## 2021-04-19 DIAGNOSIS — Z95.2 PRESENCE OF PROSTHETIC HEART VALVE: ICD-10-CM

## 2021-04-19 DIAGNOSIS — N62 HYPERTROPHY OF BREAST: ICD-10-CM

## 2021-04-19 LAB
ANION GAP SERPL CALC-SCNC: 7 MMOL/L — SIGNIFICANT CHANGE UP (ref 5–17)
APTT BLD: 76 SEC — HIGH (ref 27.5–35.5)
BUN SERPL-MCNC: 23 MG/DL — SIGNIFICANT CHANGE UP (ref 7–23)
CALCIUM SERPL-MCNC: 8.7 MG/DL — SIGNIFICANT CHANGE UP (ref 8.4–10.5)
CHLORIDE SERPL-SCNC: 104 MMOL/L — SIGNIFICANT CHANGE UP (ref 96–108)
CO2 SERPL-SCNC: 27 MMOL/L — SIGNIFICANT CHANGE UP (ref 22–31)
CREAT SERPL-MCNC: 0.81 MG/DL — SIGNIFICANT CHANGE UP (ref 0.5–1.3)
GLUCOSE SERPL-MCNC: 90 MG/DL — SIGNIFICANT CHANGE UP (ref 70–99)
HCT VFR BLD CALC: 39.1 % — SIGNIFICANT CHANGE UP (ref 34.5–45)
HGB BLD-MCNC: 13.4 G/DL — SIGNIFICANT CHANGE UP (ref 11.5–15.5)
INR BLD: 3.58 RATIO — HIGH (ref 0.88–1.16)
MCHC RBC-ENTMCNC: 31.8 PG — SIGNIFICANT CHANGE UP (ref 27–34)
MCHC RBC-ENTMCNC: 34.3 GM/DL — SIGNIFICANT CHANGE UP (ref 32–36)
MCV RBC AUTO: 92.9 FL — SIGNIFICANT CHANGE UP (ref 80–100)
NRBC # BLD: 0 /100 WBCS — SIGNIFICANT CHANGE UP (ref 0–0)
PLATELET # BLD AUTO: 324 K/UL — SIGNIFICANT CHANGE UP (ref 150–400)
POTASSIUM SERPL-MCNC: 4 MMOL/L — SIGNIFICANT CHANGE UP (ref 3.5–5.3)
POTASSIUM SERPL-SCNC: 4 MMOL/L — SIGNIFICANT CHANGE UP (ref 3.5–5.3)
PROTHROM AB SERPL-ACNC: 40.8 SEC — HIGH (ref 10.6–13.6)
RBC # BLD: 4.21 M/UL — SIGNIFICANT CHANGE UP (ref 3.8–5.2)
RBC # FLD: 13.2 % — SIGNIFICANT CHANGE UP (ref 10.3–14.5)
SODIUM SERPL-SCNC: 138 MMOL/L — SIGNIFICANT CHANGE UP (ref 135–145)
WBC # BLD: 6.92 K/UL — SIGNIFICANT CHANGE UP (ref 3.8–10.5)
WBC # FLD AUTO: 6.92 K/UL — SIGNIFICANT CHANGE UP (ref 3.8–10.5)

## 2021-04-19 PROCEDURE — 85610 PROTHROMBIN TIME: CPT

## 2021-04-19 PROCEDURE — 93010 ELECTROCARDIOGRAM REPORT: CPT | Mod: NC

## 2021-04-19 PROCEDURE — 36415 COLL VENOUS BLD VENIPUNCTURE: CPT

## 2021-04-19 PROCEDURE — 93005 ELECTROCARDIOGRAM TRACING: CPT

## 2021-04-19 PROCEDURE — 85730 THROMBOPLASTIN TIME PARTIAL: CPT

## 2021-04-19 PROCEDURE — 85027 COMPLETE CBC AUTOMATED: CPT

## 2021-04-19 PROCEDURE — 80048 BASIC METABOLIC PNL TOTAL CA: CPT

## 2021-04-19 PROCEDURE — G0463: CPT

## 2021-04-19 NOTE — H&P PST ADULT - ATTENDING COMMENTS
The patient is a 51 year old female who was recently diagnosed with left 1-2:00 invasive ductal breast cancer. She presents to undergo a left breast wide excision lumpectomy, left axillary sentinel lymph node biopsy, possible left axillary lymph node dissection, with bilateral oncoplastic closure/reduction.

## 2021-04-19 NOTE — H&P PST ADULT - NSICDXPASTSURGICALHX_GEN_ALL_CORE_FT
PAST SURGICAL HISTORY:  Mitral valve replaced 2017    Status post balloon mitral valvuloplasty 2011

## 2021-04-19 NOTE — H&P PST ADULT - NSICDXPROBLEM_GEN_ALL_CORE_FT
PROBLEM DIAGNOSES  Problem: A-fib  Assessment and Plan: Takes medications as prescribed     Problem: H/O mitral valve replacement  Assessment and Plan: Takes medications as prescribed.  Requesting cardio clearance, EKG on chart.  Cardio to address Coumadin and ASA instructions    Problem: Malignant neoplasm of upper-outer quadrant of left female breast  Assessment and Plan: Scheduled for left wide excision lumpectomy, left axillary sentienl lymph node biopsy with Dr. Palleschi and Dr Alva.  Pre op instructions reviewed with patient and daughter and verbalized understanding.  CBC, BMP, PT/PTT/INR EKG medical and cardio clearance pending.  COVID-19 test TBS- information provided.  Chlorhexidine wash given with instructions.

## 2021-04-19 NOTE — H&P PST ADULT - NSANTHOSAYNRD_GEN_A_CORE
neck 14.5 inches/No. LUNA screening performed.  STOP BANG Legend: 0-2 = LOW Risk; 3-4 = INTERMEDIATE Risk; 5-8 = HIGH Risk

## 2021-04-19 NOTE — H&P PST ADULT - NSICDXPASTMEDICALHX_GEN_ALL_CORE_FT
PAST MEDICAL HISTORY:  Atrial fibrillation     Hyperlipidemia     Low vitamin B12 level     Mitral regurgitation     Vitamin D deficiency

## 2021-04-19 NOTE — H&P PST ADULT - HISTORY OF PRESENT ILLNESS
52 y/o Citizen of [place] speaking female with PMH of a-fib, Mitral valve replacement, and hyperlipidemia presents for PST with daughter Heather.  Patient reports abnormal breast exam at GYN.  Had additional work up including mammogram, and biopsy.  Now scheduled for left breast wide excision lumpectomy, left axillary node sentinel lymph node biopsy with Dr Alva and Dr Palleschi on 05/03/2021

## 2021-04-26 PROBLEM — E55.9 VITAMIN D DEFICIENCY, UNSPECIFIED: Chronic | Status: ACTIVE | Noted: 2021-04-19

## 2021-04-26 PROBLEM — E53.8 DEFICIENCY OF OTHER SPECIFIED B GROUP VITAMINS: Chronic | Status: ACTIVE | Noted: 2021-04-19

## 2021-04-26 PROBLEM — E78.5 HYPERLIPIDEMIA, UNSPECIFIED: Chronic | Status: ACTIVE | Noted: 2021-04-19

## 2021-04-29 DIAGNOSIS — Z01.818 ENCOUNTER FOR OTHER PREPROCEDURAL EXAMINATION: ICD-10-CM

## 2021-04-30 ENCOUNTER — APPOINTMENT (OUTPATIENT)
Dept: DISASTER EMERGENCY | Facility: CLINIC | Age: 51
End: 2021-04-30

## 2021-05-01 LAB — SARS-COV-2 N GENE NPH QL NAA+PROBE: NOT DETECTED

## 2021-05-02 ENCOUNTER — TRANSCRIPTION ENCOUNTER (OUTPATIENT)
Age: 51
End: 2021-05-02

## 2021-05-03 ENCOUNTER — RESULT REVIEW (OUTPATIENT)
Age: 51
End: 2021-05-03

## 2021-05-03 ENCOUNTER — OUTPATIENT (OUTPATIENT)
Dept: OUTPATIENT SERVICES | Facility: HOSPITAL | Age: 51
LOS: 1 days | End: 2021-05-03
Payer: COMMERCIAL

## 2021-05-03 VITALS
SYSTOLIC BLOOD PRESSURE: 93 MMHG | HEART RATE: 72 BPM | WEIGHT: 143.3 LBS | HEIGHT: 61.75 IN | DIASTOLIC BLOOD PRESSURE: 57 MMHG | TEMPERATURE: 98 F | OXYGEN SATURATION: 96 % | RESPIRATION RATE: 16 BRPM

## 2021-05-03 DIAGNOSIS — N62 HYPERTROPHY OF BREAST: ICD-10-CM

## 2021-05-03 DIAGNOSIS — Z95.2 PRESENCE OF PROSTHETIC HEART VALVE: Chronic | ICD-10-CM

## 2021-05-03 DIAGNOSIS — Z85.3 PERSONAL HISTORY OF MALIGNANT NEOPLASM OF BREAST: ICD-10-CM

## 2021-05-03 DIAGNOSIS — Z98.890 OTHER SPECIFIED POSTPROCEDURAL STATES: Chronic | ICD-10-CM

## 2021-05-03 DIAGNOSIS — S21.002A UNSPECIFIED OPEN WOUND OF LEFT BREAST, INITIAL ENCOUNTER: ICD-10-CM

## 2021-05-03 DIAGNOSIS — Z01.818 ENCOUNTER FOR OTHER PREPROCEDURAL EXAMINATION: ICD-10-CM

## 2021-05-03 LAB
APTT BLD: 36.4 SEC — HIGH (ref 27.5–35.5)
HCT VFR BLD CALC: 39.5 % — SIGNIFICANT CHANGE UP (ref 34.5–45)
HGB BLD-MCNC: 13.3 G/DL — SIGNIFICANT CHANGE UP (ref 11.5–15.5)
INR BLD: 1.18 RATIO — HIGH (ref 0.88–1.16)
MCHC RBC-ENTMCNC: 31.9 PG — SIGNIFICANT CHANGE UP (ref 27–34)
MCHC RBC-ENTMCNC: 33.7 GM/DL — SIGNIFICANT CHANGE UP (ref 32–36)
MCV RBC AUTO: 94.7 FL — SIGNIFICANT CHANGE UP (ref 80–100)
NRBC # BLD: 0 /100 WBCS — SIGNIFICANT CHANGE UP (ref 0–0)
PLATELET # BLD AUTO: 295 K/UL — SIGNIFICANT CHANGE UP (ref 150–400)
PROTHROM AB SERPL-ACNC: 14.2 SEC — HIGH (ref 10.6–13.6)
RBC # BLD: 4.17 M/UL — SIGNIFICANT CHANGE UP (ref 3.8–5.2)
RBC # FLD: 13.1 % — SIGNIFICANT CHANGE UP (ref 10.3–14.5)
WBC # BLD: 14.74 K/UL — HIGH (ref 3.8–10.5)
WBC # FLD AUTO: 14.74 K/UL — HIGH (ref 3.8–10.5)

## 2021-05-03 PROCEDURE — 88304 TISSUE EXAM BY PATHOLOGIST: CPT | Mod: 26

## 2021-05-03 PROCEDURE — 88305 TISSUE EXAM BY PATHOLOGIST: CPT | Mod: 26

## 2021-05-03 PROCEDURE — 88307 TISSUE EXAM BY PATHOLOGIST: CPT | Mod: 26

## 2021-05-03 RX ORDER — SIMVASTATIN 20 MG/1
20 TABLET, FILM COATED ORAL AT BEDTIME
Refills: 0 | Status: DISCONTINUED | OUTPATIENT
Start: 2021-05-03 | End: 2021-05-03

## 2021-05-03 RX ORDER — SIMVASTATIN 20 MG/1
1 TABLET, FILM COATED ORAL
Qty: 0 | Refills: 0 | DISCHARGE

## 2021-05-03 RX ORDER — ONDANSETRON 8 MG/1
4 TABLET, FILM COATED ORAL EVERY 6 HOURS
Refills: 0 | Status: DISCONTINUED | OUTPATIENT
Start: 2021-05-03 | End: 2021-05-18

## 2021-05-03 RX ORDER — WARFARIN SODIUM 2.5 MG/1
1 TABLET ORAL
Qty: 0 | Refills: 0 | DISCHARGE

## 2021-05-03 RX ORDER — OXYCODONE HYDROCHLORIDE 5 MG/1
5 TABLET ORAL EVERY 4 HOURS
Refills: 0 | Status: DISCONTINUED | OUTPATIENT
Start: 2021-05-03 | End: 2021-05-04

## 2021-05-03 RX ORDER — ENOXAPARIN SODIUM 100 MG/ML
60 INJECTION SUBCUTANEOUS EVERY 12 HOURS
Refills: 0 | Status: DISCONTINUED | OUTPATIENT
Start: 2021-05-04 | End: 2021-05-18

## 2021-05-03 RX ORDER — ONDANSETRON 8 MG/1
4 TABLET, FILM COATED ORAL ONCE
Refills: 0 | Status: DISCONTINUED | OUTPATIENT
Start: 2021-05-03 | End: 2021-05-03

## 2021-05-03 RX ORDER — METOPROLOL TARTRATE 50 MG
25 TABLET ORAL
Refills: 0 | Status: DISCONTINUED | OUTPATIENT
Start: 2021-05-03 | End: 2021-05-18

## 2021-05-03 RX ORDER — SODIUM CHLORIDE 9 MG/ML
1000 INJECTION, SOLUTION INTRAVENOUS
Refills: 0 | Status: DISCONTINUED | OUTPATIENT
Start: 2021-05-03 | End: 2021-05-03

## 2021-05-03 RX ORDER — POTASSIUM CHLORIDE 20 MEQ
1 PACKET (EA) ORAL
Qty: 0 | Refills: 0 | DISCHARGE

## 2021-05-03 RX ORDER — ACETAMINOPHEN 500 MG
650 TABLET ORAL EVERY 6 HOURS
Refills: 0 | Status: DISCONTINUED | OUTPATIENT
Start: 2021-05-03 | End: 2021-05-18

## 2021-05-03 RX ORDER — SODIUM CHLORIDE 9 MG/ML
1000 INJECTION, SOLUTION INTRAVENOUS
Refills: 0 | Status: DISCONTINUED | OUTPATIENT
Start: 2021-05-03 | End: 2021-05-18

## 2021-05-03 RX ORDER — DIGOXIN 250 MCG
1 TABLET ORAL
Qty: 0 | Refills: 0 | DISCHARGE

## 2021-05-03 RX ORDER — CEFAZOLIN SODIUM 1 G
2000 VIAL (EA) INJECTION EVERY 8 HOURS
Refills: 0 | Status: COMPLETED | OUTPATIENT
Start: 2021-05-03 | End: 2021-05-04

## 2021-05-03 RX ORDER — HYDROMORPHONE HYDROCHLORIDE 2 MG/ML
0.5 INJECTION INTRAMUSCULAR; INTRAVENOUS; SUBCUTANEOUS
Refills: 0 | Status: DISCONTINUED | OUTPATIENT
Start: 2021-05-03 | End: 2021-05-03

## 2021-05-03 RX ORDER — HYDROMORPHONE HYDROCHLORIDE 2 MG/ML
1 INJECTION INTRAMUSCULAR; INTRAVENOUS; SUBCUTANEOUS
Refills: 0 | Status: DISCONTINUED | OUTPATIENT
Start: 2021-05-03 | End: 2021-05-03

## 2021-05-03 RX ORDER — ASPIRIN/CALCIUM CARB/MAGNESIUM 324 MG
81 TABLET ORAL DAILY
Refills: 0 | Status: DISCONTINUED | OUTPATIENT
Start: 2021-05-03 | End: 2021-05-18

## 2021-05-03 RX ORDER — PREGABALIN 225 MG/1
1 CAPSULE ORAL
Qty: 0 | Refills: 0 | DISCHARGE

## 2021-05-03 RX ORDER — ATORVASTATIN CALCIUM 80 MG/1
10 TABLET, FILM COATED ORAL AT BEDTIME
Refills: 0 | Status: DISCONTINUED | OUTPATIENT
Start: 2021-05-03 | End: 2021-05-18

## 2021-05-03 RX ORDER — MORPHINE SULFATE 50 MG/1
4 CAPSULE, EXTENDED RELEASE ORAL EVERY 4 HOURS
Refills: 0 | Status: DISCONTINUED | OUTPATIENT
Start: 2021-05-03 | End: 2021-05-03

## 2021-05-03 RX ORDER — CHOLECALCIFEROL (VITAMIN D3) 125 MCG
0 CAPSULE ORAL
Qty: 0 | Refills: 0 | DISCHARGE

## 2021-05-03 RX ADMIN — SODIUM CHLORIDE 50 MILLILITER(S): 9 INJECTION, SOLUTION INTRAVENOUS at 14:12

## 2021-05-03 RX ADMIN — ATORVASTATIN CALCIUM 10 MILLIGRAM(S): 80 TABLET, FILM COATED ORAL at 22:28

## 2021-05-03 RX ADMIN — HYDROMORPHONE HYDROCHLORIDE 0.5 MILLIGRAM(S): 2 INJECTION INTRAMUSCULAR; INTRAVENOUS; SUBCUTANEOUS at 19:05

## 2021-05-03 RX ADMIN — SODIUM CHLORIDE 75 MILLILITER(S): 9 INJECTION, SOLUTION INTRAVENOUS at 22:27

## 2021-05-03 RX ADMIN — ONDANSETRON 4 MILLIGRAM(S): 8 TABLET, FILM COATED ORAL at 21:38

## 2021-05-03 RX ADMIN — HYDROMORPHONE HYDROCHLORIDE 0.5 MILLIGRAM(S): 2 INJECTION INTRAMUSCULAR; INTRAVENOUS; SUBCUTANEOUS at 19:50

## 2021-05-03 RX ADMIN — Medication 100 MILLIGRAM(S): at 23:42

## 2021-05-03 RX ADMIN — HYDROMORPHONE HYDROCHLORIDE 0.5 MILLIGRAM(S): 2 INJECTION INTRAMUSCULAR; INTRAVENOUS; SUBCUTANEOUS at 19:32

## 2021-05-03 RX ADMIN — HYDROMORPHONE HYDROCHLORIDE 0.5 MILLIGRAM(S): 2 INJECTION INTRAMUSCULAR; INTRAVENOUS; SUBCUTANEOUS at 19:10

## 2021-05-03 NOTE — BRIEF OPERATIVE NOTE - SPECIMENS
left axillary sentinel lymph nodes x  ; left 1-2:00 breast lumpectomy left axillary sentinel lymph nodes x 5; left 2:00 breast lumpectomy

## 2021-05-03 NOTE — ASU PATIENT PROFILE, ADULT - PMH
Atrial fibrillation    Hyperlipidemia    Low vitamin B12 level    Mitral regurgitation    Vitamin D deficiency

## 2021-05-03 NOTE — ASU DISCHARGE PLAN (ADULT/PEDIATRIC) - ASU DC SPECIAL INSTRUCTIONSFT
shower in 48hrs, reapply bra after showers shower in 48hrs, reapply bra after showers  Empty and record drainage from drain twice a day   Sleep on back   Wear bra for comfort at all times  Continue the Lovenox injections twice a day (every 12 hours)  Restart your Coumadin 6 mg  at bedtime tonight May 4 th   Take your pain medication as prescribed by Dr Savage office -  if only mild discomfort take   tylenol only .  Follow up with your cardiologist on Friday  May 7 to adjust Coumadin and re evaluate the need for Lovenox .  Follow up with Dr Alva on Monday May 10 call office for appointment   Follow up with Dr Palleschi in 2 weeks call office for appointment

## 2021-05-03 NOTE — PRE-ANESTHESIA EVALUATION ADULT - NSANTHADDINFOFT_GEN_ALL_CORE
Disussed GA in detail with patient and all questions sought and answered. Pt. agrees to all plans and wishes to proceed with surgical care.    Medical evaluation/optimization and clearance noted and appreciated.

## 2021-05-03 NOTE — ASU DISCHARGE PLAN (ADULT/PEDIATRIC) - PROVIDER TOKENS
PROVIDER:[TOKEN:[[id number]]],PROVIDER:[TOKEN:[[id number]]] PROVIDER:[TOKEN:[[id number]],SCHEDULEDAPPT:[05/10/2021]],PROVIDER:[TOKEN:[[id number]],FOLLOWUP:[2 weeks]]

## 2021-05-03 NOTE — BRIEF OPERATIVE NOTE - NSICDXBRIEFPOSTOP_GEN_ALL_CORE_FT
POST-OP DIAGNOSIS:  Carcinoma of upper-outer quadrant of left female breast 03-May-2021 14:09:46  Palleschi, Susan M  
POST-OP DIAGNOSIS:  Carcinoma of upper-outer quadrant of left female breast 03-May-2021 14:09:46  Palleschi, Susan M

## 2021-05-03 NOTE — ASU PATIENT PROFILE, ADULT - LANGUAGE ASSISTANCE NEEDED
No-Patient/Caregiver offered and refused free interpretation services. this RN speaks Bermudian/No-Patient/Caregiver offered and refused free interpretation services.

## 2021-05-03 NOTE — BRIEF OPERATIVE NOTE - NSICDXBRIEFPREOP_GEN_ALL_CORE_FT
PRE-OP DIAGNOSIS:  Carcinoma of upper-outer quadrant of left female breast 03-May-2021 14:08:43  Palleschi, Susan M  
PRE-OP DIAGNOSIS:  Carcinoma of upper-outer quadrant of left female breast 03-May-2021 14:08:43  Palleschi, Susan M

## 2021-05-03 NOTE — ASU DISCHARGE PLAN (ADULT/PEDIATRIC) - DRIVING DURATION DAY(S)
for 24hrs and while taking prescribed pain meds no driving for 72 hours and no driving if taking pain medications

## 2021-05-03 NOTE — ASU DISCHARGE PLAN (ADULT/PEDIATRIC) - CARE PROVIDER_API CALL
J Luis Alva)  Plastic Surgery  [address], [address]  [place] [address]  Phone: [phone]  Fax: [phone]  Follow Up Time:     Palleschi, Susan M (MD)  Surgery  [address] [place] [address]  Phone: [phone]  Fax: [phone]  Follow Up Time:    J Luis Alva)  Plastic Surgery  [address], [address]  [place] [address]  Phone: [phone]  Fax: [phone]  Scheduled Appointment: 05/10/2021    Palleschi, Susan M (MD)  Surgery  [address] [place] [address]  Phone: [phone]  Fax: [phone]  Follow Up Time: 2 weeks

## 2021-05-03 NOTE — BRIEF OPERATIVE NOTE - NSICDXBRIEFPROCEDURE_GEN_ALL_CORE_FT
PROCEDURES:  Lumpectomy, breast, with sentinel lymph node biopsy 03-May-2021 14:07:31 left 1-2:00 Palleschi, Susan M  Oncoplastic breast reduction 03-May-2021 16:53:59 bilateral Linn Ryan  
PROCEDURES:  Lumpectomy, breast, with sentinel lymph node biopsy 03-May-2021 14:07:31 left 1-2:00 Palleschi, Susan M

## 2021-05-04 ENCOUNTER — TRANSCRIPTION ENCOUNTER (OUTPATIENT)
Age: 51
End: 2021-05-04

## 2021-05-04 VITALS
SYSTOLIC BLOOD PRESSURE: 98 MMHG | OXYGEN SATURATION: 99 % | DIASTOLIC BLOOD PRESSURE: 53 MMHG | TEMPERATURE: 98 F | HEART RATE: 70 BPM | RESPIRATION RATE: 16 BRPM

## 2021-05-04 LAB
ANION GAP SERPL CALC-SCNC: 8 MMOL/L — SIGNIFICANT CHANGE UP (ref 5–17)
BUN SERPL-MCNC: 14 MG/DL — SIGNIFICANT CHANGE UP (ref 7–23)
CALCIUM SERPL-MCNC: 9.3 MG/DL — SIGNIFICANT CHANGE UP (ref 8.4–10.5)
CHLORIDE SERPL-SCNC: 103 MMOL/L — SIGNIFICANT CHANGE UP (ref 96–108)
CO2 SERPL-SCNC: 29 MMOL/L — SIGNIFICANT CHANGE UP (ref 22–31)
CREAT SERPL-MCNC: 0.81 MG/DL — SIGNIFICANT CHANGE UP (ref 0.5–1.3)
GLUCOSE SERPL-MCNC: 123 MG/DL — HIGH (ref 70–99)
INR BLD: 1.17 RATIO — HIGH (ref 0.88–1.16)
POTASSIUM SERPL-MCNC: 4.5 MMOL/L — SIGNIFICANT CHANGE UP (ref 3.5–5.3)
POTASSIUM SERPL-SCNC: 4.5 MMOL/L — SIGNIFICANT CHANGE UP (ref 3.5–5.3)
PROTHROM AB SERPL-ACNC: 14.1 SEC — HIGH (ref 10.6–13.6)
SODIUM SERPL-SCNC: 140 MMOL/L — SIGNIFICANT CHANGE UP (ref 135–145)

## 2021-05-04 PROCEDURE — A9541: CPT

## 2021-05-04 PROCEDURE — 88305 TISSUE EXAM BY PATHOLOGIST: CPT

## 2021-05-04 PROCEDURE — 85610 PROTHROMBIN TIME: CPT

## 2021-05-04 PROCEDURE — 19302 P-MASTECTOMY W/LN REMOVAL: CPT | Mod: LT

## 2021-05-04 PROCEDURE — 88307 TISSUE EXAM BY PATHOLOGIST: CPT

## 2021-05-04 PROCEDURE — 80048 BASIC METABOLIC PNL TOTAL CA: CPT

## 2021-05-04 PROCEDURE — 88304 TISSUE EXAM BY PATHOLOGIST: CPT

## 2021-05-04 PROCEDURE — 38900 IO MAP OF SENT LYMPH NODE: CPT

## 2021-05-04 PROCEDURE — C1889: CPT

## 2021-05-04 PROCEDURE — 85730 THROMBOPLASTIN TIME PARTIAL: CPT

## 2021-05-04 PROCEDURE — 19318 BREAST REDUCTION: CPT | Mod: LT

## 2021-05-04 PROCEDURE — 36415 COLL VENOUS BLD VENIPUNCTURE: CPT

## 2021-05-04 PROCEDURE — 85027 COMPLETE CBC AUTOMATED: CPT

## 2021-05-04 RX ORDER — WARFARIN SODIUM 2.5 MG/1
1 TABLET ORAL
Qty: 0 | Refills: 0 | DISCHARGE

## 2021-05-04 RX ORDER — ENOXAPARIN SODIUM 100 MG/ML
60 INJECTION SUBCUTANEOUS
Qty: 0 | Refills: 0 | DISCHARGE
Start: 2021-05-04

## 2021-05-04 RX ORDER — ENOXAPARIN SODIUM 100 MG/ML
0 INJECTION SUBCUTANEOUS
Qty: 0 | Refills: 0 | DISCHARGE

## 2021-05-04 RX ORDER — ACETAMINOPHEN 500 MG
2 TABLET ORAL
Qty: 0 | Refills: 0 | DISCHARGE
Start: 2021-05-04

## 2021-05-04 RX ORDER — WARFARIN SODIUM 2.5 MG/1
1 TABLET ORAL
Qty: 0 | Refills: 0 | DISCHARGE
Start: 2021-05-04

## 2021-05-04 RX ADMIN — ENOXAPARIN SODIUM 60 MILLIGRAM(S): 100 INJECTION SUBCUTANEOUS at 09:30

## 2021-05-04 RX ADMIN — Medication 100 MILLIGRAM(S): at 08:34

## 2021-05-04 RX ADMIN — OXYCODONE HYDROCHLORIDE 5 MILLIGRAM(S): 5 TABLET ORAL at 10:33

## 2021-05-04 NOTE — DISCHARGE NOTE NURSING/CASE MANAGEMENT/SOCIAL WORK - PATIENT PORTAL LINK FT
You can access the FollowMyHealth Patient Portal offered by [facility] by registering at the following website: [email or web address]. By joining [employer]’s FollowMyHealth portal, you will also be able to view your health information using other applications (apps) compatible with our system.

## 2021-05-04 NOTE — PHARMACOTHERAPY INTERVENTION NOTE - COMMENTS
60 mg once a day is a questionable dose; if for prophylaxis the recommended dose of enoxaparin is 40 mg daily, if for treatment the dose should be 60 mg twice a day not once a day since patient’s creatinine clearance is well above 30.  The surgeon's notes indicate enoxaparin is being used prophylactically in this patient.  ARGENIS Esparza agreed this is a questionable dose and said okay to suspend this order pending clarification by the surgeon.

## 2021-06-22 ENCOUNTER — APPOINTMENT (OUTPATIENT)
Dept: HEMATOLOGY ONCOLOGY | Facility: CLINIC | Age: 51
End: 2021-06-22

## 2021-07-13 ENCOUNTER — NON-APPOINTMENT (OUTPATIENT)
Age: 51
End: 2021-07-13

## 2021-10-19 ENCOUNTER — APPOINTMENT (OUTPATIENT)
Dept: RADIATION ONCOLOGY | Facility: CLINIC | Age: 51
End: 2021-10-19
Payer: COMMERCIAL

## 2021-10-19 VITALS — WEIGHT: 134 LBS | BODY MASS INDEX: 23.74 KG/M2 | HEIGHT: 63 IN | RESPIRATION RATE: 16 BRPM

## 2021-10-19 PROCEDURE — 99204 OFFICE O/P NEW MOD 45 MIN: CPT | Mod: 25

## 2021-10-19 RX ORDER — 70%ISOPROPYL ALCOHOL 0.7 ML/ML
70 SWAB TOPICAL
Refills: 0 | Status: ACTIVE | COMMUNITY

## 2021-10-19 RX ORDER — SIMVASTATIN 40 MG/1
TABLET, FILM COATED ORAL
Refills: 0 | Status: ACTIVE | COMMUNITY

## 2021-10-25 NOTE — REVIEW OF SYSTEMS
[Negative] : Allergic/Immunologic [FreeTextEntry5] : HTN, HLD [de-identified] : left lumpectomy and chemo for breast cancer

## 2021-10-25 NOTE — HISTORY OF PRESENT ILLNESS
[FreeTextEntry1] : 52 y/o female with h/o HTN, HLD, heart valve disease, left lumpectomy and chemo for left breast cancer presents to discuss radiation options. \par \par 11/2020 --- Pt's Gyn physician found left breast nodule during physical exam\par \par 3/2021 -- pathology showed  Final Diagnosis\par Breast, left 1:00, 4 cm from nipple, core biopsy ([id number], 1/6/21, [facility]; [place], [place]). - Invasive ductal carcinoma (see comment). - In this limited sample, the invasive tumor is Mora grade 2-3 and measures at least 1.0 cm. - Foci suspicious for lymphovascular invasion present. - Fibroadenomatous change.\par \par 5/2021 --- left breast lumpectomy  Final Diagnosis\par 1.  [place] lymph node #1, left axilla, biopsy  - One lymph node positive for metastatic carcinoma (1/1). - The largest metastatic focus measures 2.1 cm in greatest dimension. - No extranodal extension identified. - Focal biopsy site changes.\par 2.  [place] lymph node #2, left axilla, biopsy - One lymph node negative for metastatic carcinoma (0/1).\par 3.  [place] lymph node #3, left axilla, biopsy - One lymph node negative for metastatic carcinoma (0/1).\par 4.  [place] lymph node #4, left axilla, biopsy - One lymph node negative for metastatic carcinoma (0/1).\par 5.  [place] lymph node #5, left axilla, biopsy - One lymph node negative for metastatic carcinoma (0/1).\par  6.  Breast tissue and skin, right, excision - Unremarkable breast tissue and skin.\par 7.  Breast, left 2:00, lumpectomy - Invasive ductal carcinoma, Mora grade 2 (tubule formation: 3, nuclear pleomorphism: 2, mitotic activity: 1; total score 6/9). - The invasive tumor measures 1.9 cm in greatest dimension. - Ductal carcinoma in situ, nuclear grade 2, cribriform and solid types with necrosis and calcifications.  DCIS is admixed with the invasive component and constitutes less than 5% of the total invasive tumor volume. - Lymphovascular invasion present. - The resection margins are negative for carcinoma.  The invasive tumor is 0.2 cm from the nearest margin (superior). DCIS is 0.9 cm from the nearest margin (superior).\par - One lymph node negative for metastatic cacinoma (0/1). - Fibroadenomatous change. - Benign epithelial calcifications. - Focal vessels with medial calcific sclerosis.\par 8.  Skin, left breast, excision  - Unremarkable skin.\par 9.  Skin, left breast, 2:00 core biopsy scar, excision\par \par ER positive, [place] negative, Her 2 positive. \par \par 7/2021--- she completed chemotherapy with Dr Villasenor @ [place], [place]\par \par Today for radiation consultation. Denies breast pain, cough, headache, SOB\par \par \par \par \par \par \par \par

## 2021-10-25 NOTE — PHYSICAL EXAM
[Normal] : oriented to person, place and time, the affect was normal, the mood was normal and not anxious [de-identified] : left breast post surgery incision clean

## 2021-11-18 ENCOUNTER — NON-APPOINTMENT (OUTPATIENT)
Age: 51
End: 2021-11-18

## 2021-11-21 NOTE — REVIEW OF SYSTEMS
[Dysphagia: Grade 0] : Dysphagia: Grade 0 [Cough: Grade 0] : Cough: Grade 0 [Dyspnea: Grade 0] : Dyspnea: Grade 0 [Alopecia: Grade 0] : Alopecia: Grade 0 [Pruritus: Grade 0] : Pruritus: Grade 0 [Skin Atrophy: Grade 0] : Skin Atrophy: Grade 0 [Skin Hyperpigmentation: Grade 0] : Skin Hyperpigmentation: Grade 0 [Skin Induration: Grade 0] : Skin Induration: Grade 0 [Dermatitis Radiation: Grade 0] : Dermatitis Radiation: Grade 0 [Negative] : Allergic/Immunologic [FreeTextEntry5] : HTN, HLD [de-identified] : left lumpectomy and chemo for breast cancer

## 2021-11-21 NOTE — DISEASE MANAGEMENT
[Pathological] : TNM Stage: p [II] : II [TTNM] : 1c [NTNM] : 0 [de-identified] : 5240 cGy [MTNM] : x [de-identified] : left breast

## 2021-11-21 NOTE — HISTORY OF PRESENT ILLNESS
[FreeTextEntry1] : 50 y/o female with h/o HTN, HLD, heart valve disease, left lumpectomy and chemo for left breast cancer presents to discuss radiation options. Radiation planned for 5240 cGy in 20 fractions.\par \par 11/2020 --- Pt's Gyn physician found left breast nodule during physical exam\par \par 3/2021 -- pathology showed  Final Diagnosis\par Breast, left 1:00, 4 cm from nipple, core biopsy ([id number], 1/6/21, [facility]; [place], [place]). - Invasive ductal carcinoma (see comment). - In this limited sample, the invasive tumor is [place] grade 2-3 and measures at least 1.0 cm. - Foci suspicious for lymphovascular invasion present. - Fibroadenomatous change.\par \par 5/2021 --- left breast lumpectomy  Final Diagnosis\par 1.  [place] lymph node #1, left axilla, biopsy  - One lymph node positive for metastatic carcinoma (1/1). - The largest metastatic focus measures 2.1 cm in greatest dimension. - No extranodal extension identified. - Focal biopsy site changes.\par 2.  [place] lymph node #2, left axilla, biopsy - One lymph node negative for metastatic carcinoma (0/1).\par 3.  [place] lymph node #3, left axilla, biopsy - One lymph node negative for metastatic carcinoma (0/1).\par 4.  [place] lymph node #4, left axilla, biopsy - One lymph node negative for metastatic carcinoma (0/1).\par 5.  [place] lymph node #5, left axilla, biopsy - One lymph node negative for metastatic carcinoma (0/1).\par  6.  Breast tissue and skin, right, excision - Unremarkable breast tissue and skin.\par 7.  Breast, left 2:00, lumpectomy - Invasive ductal carcinoma, [place] grade 2 (tubule formation: 3, nuclear pleomorphism: 2, mitotic activity: 1; total score 6/9). - The invasive tumor measures 1.9 cm in greatest dimension. - Ductal carcinoma in situ, nuclear grade 2, cribriform and solid types with necrosis and calcifications.  DCIS is admixed with the invasive component and constitutes less than 5% of the total invasive tumor volume. - Lymphovascular invasion present. - The resection margins are negative for carcinoma.  The invasive tumor is 0.2 cm from the nearest margin (superior). DCIS is 0.9 cm from the nearest margin (superior).\par - One lymph node negative for metastatic cacinoma (0/1). - Fibroadenomatous change. - Benign epithelial calcifications. - Focal vessels with medial calcific sclerosis.\par 8.  Skin, left breast, excision  - Unremarkable skin.\par 9.  Skin, left breast, 2:00 core biopsy scar, excision\par \par ER positive, [place] negative, Her 2 positive. \par \par 7/2021--- she completed chemotherapy with Dr Villasenor @ [place], [place]\par \par On consultation day in 10/2021: Today for radiation consultation. Denies breast pain, cough, headache, SOB\par \par 3/20 fractions of radiation completed. No changes from preRT baseline.\par \par \par \par \par \par \par \par

## 2021-12-02 ENCOUNTER — NON-APPOINTMENT (OUTPATIENT)
Age: 51
End: 2021-12-02

## 2021-12-03 ENCOUNTER — NON-APPOINTMENT (OUTPATIENT)
Age: 51
End: 2021-12-03

## 2021-12-06 NOTE — HISTORY OF PRESENT ILLNESS
[FreeTextEntry1] : 52 y/o female with h/o HTN, HLD, heart valve disease, left lumpectomy and chemo for left breast cancer presents to discuss radiation options. Radiation planned for 5240 cGy in 20 fractions.\par \par 11/2020 --- Pt's Gyn physician found left breast nodule during physical exam\par \par 3/2021 -- pathology showed  Final Diagnosis\par Breast, left 1:00, 4 cm from nipple, core biopsy ([id number], 1/6/21, [facility]; [place], [place]). - Invasive ductal carcinoma (see comment). - In this limited sample, the invasive tumor is [place] grade 2-3 and measures at least 1.0 cm. - Foci suspicious for lymphovascular invasion present. - Fibroadenomatous change.\par \par 5/2021 --- left breast lumpectomy  Final Diagnosis\par 1.  [place] lymph node #1, left axilla, biopsy  - One lymph node positive for metastatic carcinoma (1/1). - The largest metastatic focus measures 2.1 cm in greatest dimension. - No extranodal extension identified. - Focal biopsy site changes.\par 2.  [place] lymph node #2, left axilla, biopsy - One lymph node negative for metastatic carcinoma (0/1).\par 3.  [place] lymph node #3, left axilla, biopsy - One lymph node negative for metastatic carcinoma (0/1).\par 4.  [place] lymph node #4, left axilla, biopsy - One lymph node negative for metastatic carcinoma (0/1).\par 5.  [place] lymph node #5, left axilla, biopsy - One lymph node negative for metastatic carcinoma (0/1).\par  6.  Breast tissue and skin, right, excision - Unremarkable breast tissue and skin.\par 7.  Breast, left 2:00, lumpectomy - Invasive ductal carcinoma, [place] grade 2 (tubule formation: 3, nuclear pleomorphism: 2, mitotic activity: 1; total score 6/9). - The invasive tumor measures 1.9 cm in greatest dimension. - Ductal carcinoma in situ, nuclear grade 2, cribriform and solid types with necrosis and calcifications.  DCIS is admixed with the invasive component and constitutes less than 5% of the total invasive tumor volume. - Lymphovascular invasion present. - The resection margins are negative for carcinoma.  The invasive tumor is 0.2 cm from the nearest margin (superior). DCIS is 0.9 cm from the nearest margin (superior).\par - One lymph node negative for metastatic cacinoma (0/1). - Fibroadenomatous change. - Benign epithelial calcifications. - Focal vessels with medial calcific sclerosis.\par 8.  Skin, left breast, excision  - Unremarkable skin.\par 9.  Skin, left breast, 2:00 core biopsy scar, excision\par \par ER positive, UT negative, Her 2 positive. \par \par 7/2021--- she completed chemotherapy with Dr Villasenor @ [place], [place]\par \par On consultation day in 10/2021: Today for radiation consultation. Denies breast pain, cough, headache, SOB\par \par 12/20 fractions of radiation completed. Using some skin cream over RT area. No cough, chest pain. \par \par \par \par \par \par \par \par

## 2021-12-06 NOTE — DISEASE MANAGEMENT
[TTNM] : 1c [NTNM] : 0 [MTNM] : x [de-identified] : 5240 cGy [de-identified] : left breast and LN

## 2021-12-09 ENCOUNTER — NON-APPOINTMENT (OUTPATIENT)
Age: 51
End: 2021-12-09

## 2021-12-14 ENCOUNTER — FORM ENCOUNTER (OUTPATIENT)
Age: 51
End: 2021-12-14

## 2021-12-15 ENCOUNTER — NON-APPOINTMENT (OUTPATIENT)
Age: 51
End: 2021-12-15

## 2021-12-15 NOTE — HISTORY OF PRESENT ILLNESS
[FreeTextEntry1] : 52 y/o female with h/o HTN, HLD, heart valve disease, left lumpectomy and chemo for left breast cancer presents to discuss radiation options. Radiation planned for 5240 cGy in 20 fractions.\par \par 11/2020 --- Pt's Gyn physician found left breast nodule during physical exam\par \par 3/2021 -- pathology showed  Final Diagnosis\par Breast, left 1:00, 4 cm from nipple, core biopsy ([id number], 1/6/21, [facility]; [place], [place]). - Invasive ductal carcinoma (see comment). - In this limited sample, the invasive tumor is [place] grade 2-3 and measures at least 1.0 cm. - Foci suspicious for lymphovascular invasion present. - Fibroadenomatous change.\par \par 5/2021 --- left breast lumpectomy  Final Diagnosis\par 1.  [place] lymph node #1, left axilla, biopsy  - One lymph node positive for metastatic carcinoma (1/1). - The largest metastatic focus measures 2.1 cm in greatest dimension. - No extranodal extension identified. - Focal biopsy site changes.\par 2.  [place] lymph node #2, left axilla, biopsy - One lymph node negative for metastatic carcinoma (0/1).\par 3.  [place] lymph node #3, left axilla, biopsy - One lymph node negative for metastatic carcinoma (0/1).\par 4.  [place] lymph node #4, left axilla, biopsy - One lymph node negative for metastatic carcinoma (0/1).\par 5.  [place] lymph node #5, left axilla, biopsy - One lymph node negative for metastatic carcinoma (0/1).\par  6.  Breast tissue and skin, right, excision - Unremarkable breast tissue and skin.\par 7.  Breast, left 2:00, lumpectomy - Invasive ductal carcinoma, [place] grade 2 (tubule formation: 3, nuclear pleomorphism: 2, mitotic activity: 1; total score 6/9). - The invasive tumor measures 1.9 cm in greatest dimension. - Ductal carcinoma in situ, nuclear grade 2, cribriform and solid types with necrosis and calcifications.  DCIS is admixed with the invasive component and constitutes less than 5% of the total invasive tumor volume. - Lymphovascular invasion present. - The resection margins are negative for carcinoma.  The invasive tumor is 0.2 cm from the nearest margin (superior). DCIS is 0.9 cm from the nearest margin (superior).\par - One lymph node negative for metastatic cacinoma (0/1). - Fibroadenomatous change. - Benign epithelial calcifications. - Focal vessels with medial calcific sclerosis.\par 8.  Skin, left breast, excision  - Unremarkable skin.\par 9.  Skin, left breast, 2:00 core biopsy scar, excision\par \par ER positive, [place] negative, Her 2 positive. \par \par 7/2021--- she completed chemotherapy with Dr Villasenor @ [place], [place]\par \par On consultation day in 10/2021: Today for radiation consultation. Denies breast pain, cough, headache, SOB\par \par 16/20 fractions of radiation completed. Using some skin cream over RT area. No cough, chest pain. \par \par \par \par \par \par \par \par

## 2021-12-15 NOTE — REVIEW OF SYSTEMS
[Dysphagia: Grade 0] : Dysphagia: Grade 0 [Cough: Grade 0] : Cough: Grade 0 [Dyspnea: Grade 0] : Dyspnea: Grade 0 [Alopecia: Grade 0] : Alopecia: Grade 0 [Pruritus: Grade 0] : Pruritus: Grade 0 [Skin Atrophy: Grade 0] : Skin Atrophy: Grade 0 [Skin Hyperpigmentation: Grade 1 - Hyperpigmentation covering <10% BSA; no psychosocial impact] : Skin Hyperpigmentation: Grade 1 - Hyperpigmentation covering <10% BSA; no psychosocial impact [Skin Induration: Grade 0] : Skin Induration: Grade 0 [Dermatitis Radiation: Grade 1 - Faint erythema or dry desquamation] : Dermatitis Radiation: Grade 1 - Faint erythema or dry desquamation [Negative] : Allergic/Immunologic [FreeTextEntry5] : HTN, HLD [de-identified] : left lumpectomy and chemo for breast cancer

## 2021-12-15 NOTE — DISEASE MANAGEMENT
[Pathological] : TNM Stage: p [II] : II [TTNM] : 1c [NTNM] : 0 [MTNM] : x [de-identified] : 5240 cGy [de-identified] : left breast and LN

## 2022-01-17 NOTE — HISTORY OF PRESENT ILLNESS
[FreeTextEntry1] : 50 y/o female with h/o HTN, HLD, heart valve disease, left lumpectomy and chemo for left breast cancer presents to discuss radiation options. Radiation planned for 5240 cGy in 20 fractions.\par \par 11/2020 --- Pt's Gyn physician found left breast nodule during physical exam\par \par 3/2021 -- pathology showed  Final Diagnosis\par Breast, left 1:00, 4 cm from nipple, core biopsy ([id number], 1/6/21, [facility]; [place], [place]). - Invasive ductal carcinoma (see comment). - In this limited sample, the invasive tumor is [place] grade 2-3 and measures at least 1.0 cm. - Foci suspicious for lymphovascular invasion present. - Fibroadenomatous change.\par \par 5/2021 --- left breast lumpectomy  Final Diagnosis\par 1.  [place] lymph node #1, left axilla, biopsy  - One lymph node positive for metastatic carcinoma (1/1). - The largest metastatic focus measures 2.1 cm in greatest dimension. - No extranodal extension identified. - Focal biopsy site changes.\par 2.  [place] lymph node #2, left axilla, biopsy - One lymph node negative for metastatic carcinoma (0/1).\par 3.  [place] lymph node #3, left axilla, biopsy - One lymph node negative for metastatic carcinoma (0/1).\par 4.  [place] lymph node #4, left axilla, biopsy - One lymph node negative for metastatic carcinoma (0/1).\par 5.  [place] lymph node #5, left axilla, biopsy - One lymph node negative for metastatic carcinoma (0/1).\par  6.  Breast tissue and skin, right, excision - Unremarkable breast tissue and skin.\par 7.  Breast, left 2:00, lumpectomy - Invasive ductal carcinoma, [place]rend[place] grade 2 (tubule formation: 3, nuclear pleomorphism: 2, mitotic activity: 1; total score 6/9). - The invasive tumor measures 1.9 cm in greatest dimension. - Ductal carcinoma in situ, nuclear grade 2, cribriform and solid types with necrosis and calcifications.  DCIS is admixed with the invasive component and constitutes less than 5% of the total invasive tumor volume. - Lymphovascular invasion present. - The resection margins are negative for carcinoma.  The invasive tumor is 0.2 cm from the nearest margin (superior). DCIS is 0.9 cm from the nearest margin (superior).\par - One lymph node negative for metastatic cacinoma (0/1). - Fibroadenomatous change. - Benign epithelial calcifications. - Focal vessels with medial calcific sclerosis.\par 8.  Skin, left breast, excision  - Unremarkable skin.\par 9.  Skin, left breast, 2:00 core biopsy scar, excision\par \par ER positive, [place] negative, Her 2 positive. \par \par 7/2021--- she completed chemotherapy with Dr Villasenor @ [place], [place]\par \par On consultation day in 10/2021: Today for radiation consultation. Denies breast pain, cough, headache, SOB\par \par 20/20 fractions of radiation completed. Using some skin cream over RT area. No cough, chest pain. \par \par \par \par \par \par \par \par

## 2022-01-17 NOTE — DISEASE MANAGEMENT
[TTNM] : 1c [NTNM] : 0 [MTNM] : x [de-identified] : 5240 cGy [de-identified] : left breast and LN

## 2022-01-19 ENCOUNTER — APPOINTMENT (OUTPATIENT)
Dept: RADIATION ONCOLOGY | Facility: CLINIC | Age: 52
End: 2022-01-19

## 2022-03-23 ENCOUNTER — APPOINTMENT (OUTPATIENT)
Dept: RADIATION ONCOLOGY | Facility: CLINIC | Age: 52
End: 2022-03-23
Payer: COMMERCIAL

## 2022-03-23 PROCEDURE — 99214 OFFICE O/P EST MOD 30 MIN: CPT

## 2022-03-28 NOTE — REVIEW OF SYSTEMS
[Negative] : Allergic/Immunologic [Dysphagia: Grade 0] : Dysphagia: Grade 0 [Cough: Grade 0] : Cough: Grade 0 [Dyspnea: Grade 0] : Dyspnea: Grade 0 [Alopecia: Grade 0] : Alopecia: Grade 0 [Pruritus: Grade 0] : Pruritus: Grade 0 [Skin Atrophy: Grade 0] : Skin Atrophy: Grade 0 [Skin Hyperpigmentation: Grade 1 - Hyperpigmentation covering <10% BSA; no psychosocial impact] : Skin Hyperpigmentation: Grade 1 - Hyperpigmentation covering <10% BSA; no psychosocial impact [Skin Induration: Grade 0] : Skin Induration: Grade 0 [Dermatitis Radiation: Grade 0] : Dermatitis Radiation: Grade 0 [FreeTextEntry5] : HTN, HLD [de-identified] : left lumpectomy and chemo for breast cancer

## 2022-03-28 NOTE — DISEASE MANAGEMENT
[Pathological] : TNM Stage: p [II] : II [TTNM] : 1c [NTNM] : 0 [MTNM] : x [de-identified] : 5240 cGy [de-identified] : left breast and LN

## 2022-03-28 NOTE — HISTORY OF PRESENT ILLNESS
[FreeTextEntry1] : 50 y/o female with h/o HTN, HLD, heart valve disease, left lumpectomy and chemo for left breast cancer, h/o Radiation to left breast  for 5240 cGy in 20 fractions in 12/2021 presents for f/u\par \par 11/2020 --- Pt's Gyn physician found left breast nodule during physical exam\par \par 3/2021 -- pathology showed  Final Diagnosis\par Breast, left 1:00, 4 cm from nipple, core biopsy ([id number], 1/6/21, [facility]; [place], [place]). - Invasive ductal carcinoma (see comment). - In this limited sample, the invasive tumor is [place] grade 2-3 and measures at least 1.0 cm. - Foci suspicious for lymphovascular invasion present. - Fibroadenomatous change.\par \par 5/2021 --- left breast lumpectomy  Final Diagnosis\par 1.  [place] lymph node #1, left axilla, biopsy  - One lymph node positive for metastatic carcinoma (1/1). - The largest metastatic focus measures 2.1 cm in greatest dimension. - No extranodal extension identified. - Focal biopsy site changes.\par 2.  [place] lymph node #2, left axilla, biopsy - One lymph node negative for metastatic carcinoma (0/1).\par 3.  [place] lymph node #3, left axilla, biopsy - One lymph node negative for metastatic carcinoma (0/1).\par 4.  [place] lymph node #4, left axilla, biopsy - One lymph node negative for metastatic carcinoma (0/1).\par 5.  [place] lymph node #5, left axilla, biopsy - One lymph node negative for metastatic carcinoma (0/1).\par  6.  Breast tissue and skin, right, excision - Unremarkable breast tissue and skin.\par 7.  Breast, left 2:00, lumpectomy - Invasive ductal carcinoma, [place] grade 2 (tubule formation: 3, nuclear pleomorphism: 2, mitotic activity: 1; total score 6/9). - The invasive tumor measures 1.9 cm in greatest dimension. - Ductal carcinoma in situ, nuclear grade 2, cribriform and solid types with necrosis and calcifications.  DCIS is admixed with the invasive component and constitutes less than 5% of the total invasive tumor volume. - Lymphovascular invasion present. - The resection margins are negative for carcinoma.  The invasive tumor is 0.2 cm from the nearest margin (superior). DCIS is 0.9 cm from the nearest margin (superior).\par - One lymph node negative for metastatic cacinoma (0/1). - Fibroadenomatous change. - Benign epithelial calcifications. - Focal vessels with medial calcific sclerosis.\par 8.  Skin, left breast, excision  - Unremarkable skin.\par 9.  Skin, left breast, 2:00 core biopsy scar, excision\par \par ER positive, [place] negative, Her 2 positive. \par \par 7/2021--- she completed chemotherapy with Dr Villasenor @ [facility] [place]\par \par On consultation day in 10/2021: Today for radiation consultation. Denies breast pain, cough, headache, SOB\par \par Last OTV note during radiation in 12/2021: 20/20 fractions of radiation completed. Using some skin cream over RT area. No cough, chest pain. \par \par She completed Radiation to left breast for 5240 cGy in 20 fractions in 12/2021. No breast pain, SOB, cough, dysphagia. Will see surgeon in 4/2022 for possible mammogram. \par

## 2022-04-18 ENCOUNTER — APPOINTMENT (OUTPATIENT)
Dept: MAMMOGRAPHY | Facility: CLINIC | Age: 52
End: 2022-04-18
Payer: COMMERCIAL

## 2022-04-18 ENCOUNTER — APPOINTMENT (OUTPATIENT)
Dept: ULTRASOUND IMAGING | Facility: CLINIC | Age: 52
End: 2022-04-18
Payer: COMMERCIAL

## 2022-04-18 PROCEDURE — 77066 DX MAMMO INCL CAD BI: CPT

## 2022-04-18 PROCEDURE — 76641 ULTRASOUND BREAST COMPLETE: CPT | Mod: 50

## 2022-04-18 PROCEDURE — 77062 BREAST TOMOSYNTHESIS BI: CPT

## 2022-06-29 ENCOUNTER — APPOINTMENT (OUTPATIENT)
Dept: RADIATION ONCOLOGY | Facility: CLINIC | Age: 52
End: 2022-06-29

## 2022-06-29 ENCOUNTER — NON-APPOINTMENT (OUTPATIENT)
Age: 52
End: 2022-06-29

## 2022-06-29 PROCEDURE — 99443: CPT | Mod: 95

## 2022-06-29 NOTE — REASON FOR VISIT
[Routine Follow-Up] : routine follow-up visit for [Breast Cancer] : breast cancer [Home] : at home, [unfilled] , at the time of the visit. [Medical Office: ([facility])___] : at the medical office located in  [Family Member] : family member [Verbal consent obtained from patient] : the patient, [unfilled]

## 2022-07-05 NOTE — DISEASE MANAGEMENT
[Pathological] : TNM Stage: p [II] : II [TTNM] : 1c [NTNM] : 0 [MTNM] : x [de-identified] : 5240 cGy [de-identified] : left breast and LN

## 2022-07-05 NOTE — REVIEW OF SYSTEMS
[Negative] : Allergic/Immunologic [Dysphagia: Grade 0] : Dysphagia: Grade 0 [Cough: Grade 0] : Cough: Grade 0 [Dyspnea: Grade 0] : Dyspnea: Grade 0 [Alopecia: Grade 0] : Alopecia: Grade 0 [Pruritus: Grade 0] : Pruritus: Grade 0 [Skin Atrophy: Grade 0] : Skin Atrophy: Grade 0 [Skin Hyperpigmentation: Grade 1 - Hyperpigmentation covering <10% BSA; no psychosocial impact] : Skin Hyperpigmentation: Grade 1 - Hyperpigmentation covering <10% BSA; no psychosocial impact [Skin Induration: Grade 0] : Skin Induration: Grade 0 [Dermatitis Radiation: Grade 0] : Dermatitis Radiation: Grade 0 [FreeTextEntry5] : HTN, HLD [de-identified] : left lumpectomy and chemo for breast cancer

## 2022-07-05 NOTE — HISTORY OF PRESENT ILLNESS
[FreeTextEntry1] : 52 y/o female with h/o HTN, HLD, heart valve disease, left lumpectomy and chemo for left breast cancer, h/o Radiation to left breast  for 5240 cGy in 20 fractions in 12/2021 presents for f/u\par \par 11/2020 --- Pt's Gyn physician found left breast nodule during physical exam\par \par 3/2021 -- pathology showed  Final Diagnosis\par Breast, left 1:00, 4 cm from nipple, core biopsy ([id number], 1/6/21, [facility]; [place], [place]). - Invasive ductal carcinoma (see comment). - In this limited sample, the invasive tumor is [place] grade 2-3 and measures at least 1.0 cm. - Foci suspicious for lymphovascular invasion present. - Fibroadenomatous change.\par \par 5/2021 --- left breast lumpectomy  Final Diagnosis\par 1.  [place] lymph node #1, left axilla, biopsy  - One lymph node positive for metastatic carcinoma (1/1). - The largest metastatic focus measures 2.1 cm in greatest dimension. - No extranodal extension identified. - Focal biopsy site changes.\par 2.  [place] lymph node #2, left axilla, biopsy - One lymph node negative for metastatic carcinoma (0/1).\par 3.  [place] lymph node #3, left axilla, biopsy - One lymph node negative for metastatic carcinoma (0/1).\par 4.  [place] lymph node #4, left axilla, biopsy - One lymph node negative for metastatic carcinoma (0/1).\par 5.  [place] lymph node #5, left axilla, biopsy - One lymph node negative for metastatic carcinoma (0/1).\par  6.  Breast tissue and skin, right, excision - Unremarkable breast tissue and skin.\par 7.  Breast, left 2:00, lumpectomy - Invasive ductal carcinoma, [place] grade 2 (tubule formation: 3, nuclear pleomorphism: 2, mitotic activity: 1; total score 6/9). - The invasive tumor measures 1.9 cm in greatest dimension. - Ductal carcinoma in situ, nuclear grade 2, cribriform and solid types with necrosis and calcifications.  DCIS is admixed with the invasive component and constitutes less than 5% of the total invasive tumor volume. - Lymphovascular invasion present. - The resection margins are negative for carcinoma.  The invasive tumor is 0.2 cm from the nearest margin (superior). DCIS is 0.9 cm from the nearest margin (superior).\par - One lymph node negative for metastatic cacinoma (0/1). - Fibroadenomatous change. - Benign epithelial calcifications. - Focal vessels with medial calcific sclerosis.\par 8.  Skin, left breast, excision  - Unremarkable skin.\par 9.  Skin, left breast, 2:00 core biopsy scar, excision\par \par ER positive, [place] negative, Her 2 positive. \par \par 7/2021--- she completed chemotherapy with Dr Villasenor @ [facility] [place]\par \par On consultation day in 10/2021: Today for radiation consultation. Denies breast pain, cough, headache, SOB\par \par Last OTV note during radiation in 12/2021: 20/20 fractions of radiation completed. Using some skin cream over RT area. No cough, chest pain. \par \par 4/2022 -- mammography showed BI-RADS 2-- benign. \par \par She completed Radiation to left breast for 5240 cGy in 20 fractions in 12/2021. No breast pain, SOB, cough, dysphagia. Advised to continue f/u with surgeon\par

## 2022-11-02 NOTE — PROGRESS NOTE ADULT - PROBLEM SELECTOR PROBLEM 3
Edema, lower extremity
Edema, lower extremity
Current use of anticoagulant therapy
O-Z Plasty Text: The defect edges were debeveled with a #15 scalpel blade.  Given the location of the defect, shape of the defect and the proximity to free margins an O-Z plasty (double transposition flap) was deemed most appropriate.  Using a sterile surgical marker, the appropriate transposition flaps were drawn incorporating the defect and placing the expected incisions within the relaxed skin tension lines where possible.    The area thus outlined was incised deep to adipose tissue with a #15 scalpel blade.  The skin margins were undermined to an appropriate distance in all directions utilizing iris scissors.  Hemostasis was achieved with electrocautery.  The flaps were then transposed into place, one clockwise and the other counterclockwise, and anchored with interrupted buried subcutaneous sutures.

## 2022-12-15 ENCOUNTER — NON-APPOINTMENT (OUTPATIENT)
Age: 52
End: 2022-12-15

## 2022-12-21 NOTE — PRE-ANESTHESIA EVALUATION ADULT - NSATTENDATTESTRD_GEN_ALL_CORE
97.8 The patient has been re-examined and I agree with the above assessment or I updated with my findings.

## 2023-01-03 ENCOUNTER — APPOINTMENT (OUTPATIENT)
Dept: RADIATION ONCOLOGY | Facility: CLINIC | Age: 53
End: 2023-01-03

## 2023-01-03 ENCOUNTER — APPOINTMENT (OUTPATIENT)
Dept: RADIATION ONCOLOGY | Facility: CLINIC | Age: 53
End: 2023-01-03
Payer: COMMERCIAL

## 2023-01-03 PROCEDURE — 99443: CPT

## 2023-01-03 NOTE — REASON FOR VISIT
[Routine Follow-Up] : routine follow-up visit for [Breast Cancer] : breast cancer [Home] : at home, [unfilled] , at the time of the visit. [Medical Office: ([facility])___] : at the medical office located in  [Family Member] : family member [Verbal consent obtained from patient] : the patient, [unfilled]

## 2023-01-07 NOTE — REVIEW OF SYSTEMS
[Negative] : Allergic/Immunologic [Dysphagia: Grade 0] : Dysphagia: Grade 0 [Cough: Grade 0] : Cough: Grade 0 [Dyspnea: Grade 0] : Dyspnea: Grade 0 [Alopecia: Grade 0] : Alopecia: Grade 0 [Pruritus: Grade 0] : Pruritus: Grade 0 [Skin Atrophy: Grade 0] : Skin Atrophy: Grade 0 [Skin Hyperpigmentation: Grade 1 - Hyperpigmentation covering <10% BSA; no psychosocial impact] : Skin Hyperpigmentation: Grade 1 - Hyperpigmentation covering <10% BSA; no psychosocial impact [Skin Induration: Grade 0] : Skin Induration: Grade 0 [Dermatitis Radiation: Grade 0] : Dermatitis Radiation: Grade 0 [FreeTextEntry5] : HTN, HLD [de-identified] : left lumpectomy and chemo for breast cancer

## 2023-01-07 NOTE — DISEASE MANAGEMENT
[Pathological] : TNM Stage: p [II] : II [TTNM] : 1c [NTNM] : 0 [MTNM] : x [de-identified] : 5240 cGy [de-identified] : left breast and LN

## 2023-01-07 NOTE — HISTORY OF PRESENT ILLNESS
[FreeTextEntry1] : 50 y/o female with h/o HTN, HLD, heart valve disease, left lumpectomy and chemo for left breast cancer, h/o Radiation to left breast  for 5240 cGy in 20 fractions in 12/2021 presents for f/u\par \par 11/2020 --- Pt's Gyn physician found left breast nodule during physical exam\par \par 3/2021 -- pathology showed  Final Diagnosis\par Breast, left 1:00, 4 cm from nipple, core biopsy ([id number], 1/6/21, [facility]; [place], [place]). - Invasive ductal carcinoma (see comment). - In this limited sample, the invasive tumor is [place] grade 2-3 and measures at least 1.0 cm. - Foci suspicious for lymphovascular invasion present. - Fibroadenomatous change.\par \par 5/2021 --- left breast lumpectomy  Final Diagnosis\par 1.  [place] lymph node #1, left axilla, biopsy  - One lymph node positive for metastatic carcinoma (1/1). - The largest metastatic focus measures 2.1 cm in greatest dimension. - No extranodal extension identified. - Focal biopsy site changes.\par 2.  [place] lymph node #2, left axilla, biopsy - One lymph node negative for metastatic carcinoma (0/1).\par 3.  [place] lymph node #3, left axilla, biopsy - One lymph node negative for metastatic carcinoma (0/1).\par 4.  [place] lymph node #4, left axilla, biopsy - One lymph node negative for metastatic carcinoma (0/1).\par 5.  [place] lymph node #5, left axilla, biopsy - One lymph node negative for metastatic carcinoma (0/1).\par  6.  Breast tissue and skin, right, excision - Unremarkable breast tissue and skin.\par 7.  Breast, left 2:00, lumpectomy - Invasive ductal carcinoma, [place] grade 2 (tubule formation: 3, nuclear pleomorphism: 2, mitotic activity: 1; total score 6/9). - The invasive tumor measures 1.9 cm in greatest dimension. - Ductal carcinoma in situ, nuclear grade 2, cribriform and solid types with necrosis and calcifications.  DCIS is admixed with the invasive component and constitutes less than 5% of the total invasive tumor volume. - Lymphovascular invasion present. - The resection margins are negative for carcinoma.  The invasive tumor is 0.2 cm from the nearest margin (superior). DCIS is 0.9 cm from the nearest margin (superior).\par - One lymph node negative for metastatic cacinoma (0/1). - Fibroadenomatous change. - Benign epithelial calcifications. - Focal vessels with medial calcific sclerosis.\par 8.  Skin, left breast, excision  - Unremarkable skin.\par 9.  Skin, left breast, 2:00 core biopsy scar, excision\par \par ER positive, TX negative, Her 2 positive. \par \par 7/2021--- she completed chemotherapy with Dr Villasenor @ [facility]  [place], [place]\par \par On consultation day in 10/2021: Today for radiation consultation. Denies breast pain, cough, headache, SOB\par \par Last OTV note during radiation in 12/2021: 20/20 fractions of radiation completed. Using some skin cream over RT area. No cough, chest pain. \par \par 4/2022 -- mammography showed BI-RADS 2-- benign. \par \par 1/3/2023 F/U. She completed Radiation to left breast for 5240 cGy in 20 fractions in 12/2021. On hormone therapy with Med Onc Dr Villasenor @ [facility]. No breast pain, SOB, cough, dysphagia. Advised to continue f/u with surgeon and Med Onc. \par

## 2023-01-19 NOTE — BRIEF OPERATIVE NOTE - CONDITION POST OP
Critical Banner Transposition Flap Text: The defect edges were debeveled with a #15 scalpel blade.  Given the location of the defect and the proximity to free margins a Banner transposition flap was deemed most appropriate.  Using a sterile surgical marker, an appropriate flap drawn around the defect. The area thus outlined was incised deep to adipose tissue with a #15 scalpel blade.  The skin margins were undermined to an appropriate distance in all directions utilizing iris scissors.

## 2023-05-22 NOTE — DISCHARGE NOTE ADULT - WARFARIN/COUMADIN - COMPLIANCE
Pt has an open investigation with [employer]; Arsen Arechiga is worker ([phone]); please call prior to d/c of Pt.    [employer]  P: [phone]  F: [phone]  After hours emergency: [phone]  Electronically signed by FLOR Pepper on 5/22/2023 at 2:22 PM    ANH notified [employer] of Pt being admitted to [facility].  Electronically signed by Aaron Pepper [facility] on 5/22/2023 at 2:35 PM    ANH and , Juliet met with Mishel re: Pt status and investigation; ANH also placed call to [facility] in Risk Mgt, she confirmed that we cannot give any medical records to [employer]; but can answer questions re: the investigation/report to assist.  Electronically signed by FLOR Pepper on 5/22/2023 at 3:48 PM
Statement Selected

## 2023-07-18 ENCOUNTER — APPOINTMENT (OUTPATIENT)
Dept: RADIATION ONCOLOGY | Facility: CLINIC | Age: 53
End: 2023-07-18
Payer: COMMERCIAL

## 2023-07-18 PROCEDURE — 99448 NTRPROF PH1/NTRNET/EHR 21-30: CPT

## 2023-07-18 NOTE — REASON FOR VISIT
[Routine Follow-Up] : routine follow-up visit for [Breast Cancer] : breast cancer [Home] : at home, [unfilled] , at the time of the visit. [Medical Office: ([facility])___] : at the medical office located in  [Family Member] : family member [Verbal consent obtained from patient] : the patient, [unfilled]

## 2023-07-23 NOTE — DISEASE MANAGEMENT
[Pathological] : TNM Stage: p [II] : II [TTNM] : 1c [NTNM] : 0 [MTNM] : x [de-identified] : 5240 cGy [de-identified] : left breast and LN

## 2023-07-23 NOTE — REVIEW OF SYSTEMS
[Negative] : Allergic/Immunologic [Dysphagia: Grade 0] : Dysphagia: Grade 0 [Cough: Grade 0] : Cough: Grade 0 [Dyspnea: Grade 0] : Dyspnea: Grade 0 [Alopecia: Grade 0] : Alopecia: Grade 0 [Pruritus: Grade 0] : Pruritus: Grade 0 [Skin Atrophy: Grade 0] : Skin Atrophy: Grade 0 [Skin Hyperpigmentation: Grade 0] : Skin Hyperpigmentation: Grade 0 [Skin Induration: Grade 0] : Skin Induration: Grade 0 [Dermatitis Radiation: Grade 0] : Dermatitis Radiation: Grade 0 [FreeTextEntry5] : HTN, HLD [de-identified] : left lumpectomy and chemo for breast cancer

## 2023-07-23 NOTE — HISTORY OF PRESENT ILLNESS
[FreeTextEntry1] : 50 y/o female with h/o HTN, HLD, heart valve disease, left lumpectomy and chemo for left breast cancer, h/o Radiation to left breast  for 5240 cGy in 20 fractions in 12/2021 presents for f/u\par \par 11/2020 --- Pt's Gyn physician found left breast nodule during physical exam\par \par 3/2021 -- pathology showed  Final Diagnosis\par Breast, left 1:00, 4 cm from nipple, core biopsy ([id number], 1/6/21, [facility]; [place], [place]). - Invasive ductal carcinoma (see comment). - In this limited sample, the invasive tumor is [place] grade 2-3 and measures at least 1.0 cm. - Foci suspicious for lymphovascular invasion present. - Fibroadenomatous change.\par \par 5/2021 --- left breast lumpectomy  Final Diagnosis\par 1.  [place] lymph node #1, left axilla, biopsy  - One lymph node positive for metastatic carcinoma (1/1). - The largest metastatic focus measures 2.1 cm in greatest dimension. - No extranodal extension identified. - Focal biopsy site changes.\par 2.  [place] lymph node #2, left axilla, biopsy - One lymph node negative for metastatic carcinoma (0/1).\par 3.  [place] lymph node #3, left axilla, biopsy - One lymph node negative for metastatic carcinoma (0/1).\par 4.  [place] lymph node #4, left axilla, biopsy - One lymph node negative for metastatic carcinoma (0/1).\par 5.  [place] lymph node #5, left axilla, biopsy - One lymph node negative for metastatic carcinoma (0/1).\par  6.  Breast tissue and skin, right, excision - Unremarkable breast tissue and skin.\par 7.  Breast, left 2:00, lumpectomy - Invasive ductal carcinoma, [place] grade 2 (tubule formation: 3, nuclear pleomorphism: 2, mitotic activity: 1; total score 6/9). - The invasive tumor measures 1.9 cm in greatest dimension. - Ductal carcinoma in situ, nuclear grade 2, cribriform and solid types with necrosis and calcifications.  DCIS is admixed with the invasive component and constitutes less than 5% of the total invasive tumor volume. - Lymphovascular invasion present. - The resection margins are negative for carcinoma.  The invasive tumor is 0.2 cm from the nearest margin (superior). DCIS is 0.9 cm from the nearest margin (superior).\par - One lymph node negative for metastatic cacinoma (0/1). - Fibroadenomatous change. - Benign epithelial calcifications. - Focal vessels with medial calcific sclerosis.\par 8.  Skin, left breast, excision  - Unremarkable skin.\par 9.  Skin, left breast, 2:00 core biopsy scar, excision\par \par ER positive, NC negative, Her 2 positive. \par \par 7/2021--- she [facility]  [place] [place]\par \par On consultation day in 10/2021: Today for radiation consultation. Denies breast pain, cough, headache, SOB\par \par Last OTV note during radiation in 12/2021: 20/20 fractions of radiation completed. Using some skin cream over RT area. No cough, chest pain. \par \par 4/2022 -- mammography showed BI-RADS 2-- benign. \par \par 7/18/2023 F/U. She completed Radiation to left breast for 5240 cGy in 20 fractions in 12/2021. On hormone therapy with Med Onc Dr [facility]or [facility]. No breast pain, SOB, cough, dysphagia. Advised to continue f/u with surgeon and Med Onc. \par

## 2025-09-05 ENCOUNTER — APPOINTMENT (OUTPATIENT)
Dept: PULMONOLOGY | Facility: CLINIC | Age: 55
End: 2025-09-05